# Patient Record
Sex: FEMALE | Race: WHITE | Employment: FULL TIME | URBAN - METROPOLITAN AREA
[De-identification: names, ages, dates, MRNs, and addresses within clinical notes are randomized per-mention and may not be internally consistent; named-entity substitution may affect disease eponyms.]

---

## 2018-04-25 ENCOUNTER — OFFICE VISIT (OUTPATIENT)
Dept: FAMILY MEDICINE CLINIC | Age: 33
End: 2018-04-25
Payer: COMMERCIAL

## 2018-04-25 VITALS
HEIGHT: 66 IN | BODY MASS INDEX: 32.47 KG/M2 | HEART RATE: 104 BPM | DIASTOLIC BLOOD PRESSURE: 80 MMHG | SYSTOLIC BLOOD PRESSURE: 120 MMHG | WEIGHT: 202 LBS | OXYGEN SATURATION: 98 % | RESPIRATION RATE: 16 BRPM

## 2018-04-25 DIAGNOSIS — F41.9 ANXIETY: Primary | ICD-10-CM

## 2018-04-25 DIAGNOSIS — M51.24 THORACIC DISC HERNIATION: ICD-10-CM

## 2018-04-25 DIAGNOSIS — Z13.220 SCREENING, LIPID: ICD-10-CM

## 2018-04-25 PROCEDURE — G8417 CALC BMI ABV UP PARAM F/U: HCPCS | Performed by: FAMILY MEDICINE

## 2018-04-25 PROCEDURE — G8427 DOCREV CUR MEDS BY ELIG CLIN: HCPCS | Performed by: FAMILY MEDICINE

## 2018-04-25 PROCEDURE — 99213 OFFICE O/P EST LOW 20 MIN: CPT | Performed by: FAMILY MEDICINE

## 2018-04-25 PROCEDURE — 1036F TOBACCO NON-USER: CPT | Performed by: FAMILY MEDICINE

## 2018-04-25 RX ORDER — BUSPIRONE HYDROCHLORIDE 10 MG/1
10 TABLET ORAL 2 TIMES DAILY
Qty: 60 TABLET | Refills: 2 | Status: CANCELLED | OUTPATIENT
Start: 2018-04-25

## 2018-04-25 ASSESSMENT — PATIENT HEALTH QUESTIONNAIRE - PHQ9
SUM OF ALL RESPONSES TO PHQ QUESTIONS 1-9: 2
1. LITTLE INTEREST OR PLEASURE IN DOING THINGS: 1
SUM OF ALL RESPONSES TO PHQ9 QUESTIONS 1 & 2: 2
2. FEELING DOWN, DEPRESSED OR HOPELESS: 1

## 2018-04-27 ASSESSMENT — ENCOUNTER SYMPTOMS
SHORTNESS OF BREATH: 0
BACK PAIN: 1
WHEEZING: 0
COUGH: 0
ABDOMINAL PAIN: 0
NAUSEA: 0

## 2018-07-03 DIAGNOSIS — F41.9 ANXIETY: ICD-10-CM

## 2018-07-03 RX ORDER — AMITRIPTYLINE HYDROCHLORIDE 100 MG/1
100 TABLET, FILM COATED ORAL NIGHTLY
Qty: 30 TABLET | Refills: 5 | Status: SHIPPED | OUTPATIENT
Start: 2018-07-03 | End: 2018-12-27 | Stop reason: SDUPTHER

## 2018-07-03 NOTE — TELEPHONE ENCOUNTER
From: Chencho Mendieta  Sent: 7/3/2018 10:06 AM EDT  Subject: Medication Renewal Request    Chencho Mendieta would like a refill of the following medications:     amitriptyline (ELAVIL) 100 MG tablet Danette Fonseca MD]    Preferred pharmacy: [facility] [address] [place] [address]

## 2018-07-06 ENCOUNTER — HOSPITAL ENCOUNTER (OUTPATIENT)
Age: 33
Discharge: HOME | End: 2018-07-06
Payer: COMMERCIAL

## 2018-07-06 DIAGNOSIS — Z13.220 SCREENING, LIPID: ICD-10-CM

## 2018-07-06 DIAGNOSIS — F41.9 ANXIETY: ICD-10-CM

## 2018-07-06 LAB
ALBUMIN SERPL-MCNC: 4.2 G/DL (ref 3.5–5.2)
ALP BLD-CCNC: 73 U/L (ref 35–104)
ALT SERPL-CCNC: 22 U/L (ref 0–32)
ANION GAP SERPL CALCULATED.3IONS-SCNC: 12 MMOL/L (ref 7–16)
AST SERPL-CCNC: 20 U/L (ref 0–31)
BASOPHILS ABSOLUTE: 0.07 E9/L (ref 0–0.2)
BASOPHILS RELATIVE PERCENT: 0.8 % (ref 0–2)
BILIRUB SERPL-MCNC: <0.2 MG/DL (ref 0–1.2)
BUN BLDV-MCNC: 11 MG/DL (ref 6–20)
CALCIUM SERPL-MCNC: 8.8 MG/DL (ref 8.6–10.2)
CHLORIDE BLD-SCNC: 100 MMOL/L (ref 98–107)
CHOLESTEROL, TOTAL: 196 MG/DL (ref 0–199)
CO2: 27 MMOL/L (ref 22–29)
CREAT SERPL-MCNC: 0.6 MG/DL (ref 0.5–1)
EOSINOPHILS ABSOLUTE: 0.11 E9/L (ref 0.05–0.5)
EOSINOPHILS RELATIVE PERCENT: 1.3 % (ref 0–6)
GFR AFRICAN AMERICAN: >60
GFR NON-AFRICAN AMERICAN: >60 ML/MIN/1.73
GLUCOSE BLD-MCNC: 89 MG/DL (ref 74–109)
HCT VFR BLD CALC: 37.1 % (ref 34–48)
HDLC SERPL-MCNC: 39 MG/DL
HEMOGLOBIN: 12.5 G/DL (ref 11.5–15.5)
IMMATURE GRANULOCYTES #: 0.02 E9/L
IMMATURE GRANULOCYTES %: 0.2 % (ref 0–5)
LDL CHOLESTEROL CALCULATED: 116 MG/DL (ref 0–99)
LYMPHOCYTES ABSOLUTE: 2.38 E9/L (ref 1.5–4)
LYMPHOCYTES RELATIVE PERCENT: 27.1 % (ref 20–42)
MCH RBC QN AUTO: 29.8 PG (ref 26–35)
MCHC RBC AUTO-ENTMCNC: 33.7 % (ref 32–34.5)
MCV RBC AUTO: 88.5 FL (ref 80–99.9)
MONOCYTES ABSOLUTE: 0.41 E9/L (ref 0.1–0.95)
MONOCYTES RELATIVE PERCENT: 4.7 % (ref 2–12)
NEUTROPHILS ABSOLUTE: 5.79 E9/L (ref 1.8–7.3)
NEUTROPHILS RELATIVE PERCENT: 65.9 % (ref 43–80)
PDW BLD-RTO: 12.3 FL (ref 11.5–15)
PLATELET # BLD: 284 E9/L (ref 130–450)
PMV BLD AUTO: 9.4 FL (ref 7–12)
POTASSIUM SERPL-SCNC: 3.6 MMOL/L (ref 3.5–5)
RBC # BLD: 4.19 E12/L (ref 3.5–5.5)
SODIUM BLD-SCNC: 139 MMOL/L (ref 132–146)
TOTAL PROTEIN: 7.3 G/DL (ref 6.4–8.3)
TRIGL SERPL-MCNC: 204 MG/DL (ref 0–149)
TSH SERPL DL<=0.05 MIU/L-ACNC: 1.44 UIU/ML (ref 0.27–4.2)
VLDLC SERPL CALC-MCNC: 41 MG/DL
WBC # BLD: 8.8 E9/L (ref 4.5–11.5)

## 2018-07-06 PROCEDURE — 80061 LIPID PANEL: CPT

## 2018-07-06 PROCEDURE — 36415 COLL VENOUS BLD VENIPUNCTURE: CPT

## 2018-07-06 PROCEDURE — 84443 ASSAY THYROID STIM HORMONE: CPT

## 2018-07-06 PROCEDURE — 80053 COMPREHEN METABOLIC PANEL: CPT

## 2018-07-06 PROCEDURE — 85025 COMPLETE CBC W/AUTO DIFF WBC: CPT

## 2018-07-19 ENCOUNTER — OFFICE VISIT (OUTPATIENT)
Dept: FAMILY MEDICINE CLINIC | Age: 33
End: 2018-07-19
Payer: COMMERCIAL

## 2018-07-19 VITALS
HEART RATE: 97 BPM | OXYGEN SATURATION: 96 % | HEIGHT: 66 IN | RESPIRATION RATE: 16 BRPM | BODY MASS INDEX: 32.47 KG/M2 | SYSTOLIC BLOOD PRESSURE: 127 MMHG | DIASTOLIC BLOOD PRESSURE: 83 MMHG | WEIGHT: 202 LBS

## 2018-07-19 DIAGNOSIS — F41.9 ANXIETY: Primary | ICD-10-CM

## 2018-07-19 PROCEDURE — G8417 CALC BMI ABV UP PARAM F/U: HCPCS | Performed by: FAMILY MEDICINE

## 2018-07-19 PROCEDURE — 1036F TOBACCO NON-USER: CPT | Performed by: FAMILY MEDICINE

## 2018-07-19 PROCEDURE — 99213 OFFICE O/P EST LOW 20 MIN: CPT | Performed by: FAMILY MEDICINE

## 2018-07-19 PROCEDURE — G8427 DOCREV CUR MEDS BY ELIG CLIN: HCPCS | Performed by: FAMILY MEDICINE

## 2018-07-19 RX ORDER — BUSPIRONE HYDROCHLORIDE 15 MG/1
15 TABLET ORAL 2 TIMES DAILY
Qty: 60 TABLET | Refills: 5 | Status: SHIPPED | OUTPATIENT
Start: 2018-07-19 | End: 2019-03-20 | Stop reason: SINTOL

## 2018-07-19 RX ORDER — BUSPIRONE HYDROCHLORIDE 10 MG/1
10 TABLET ORAL 2 TIMES DAILY
Qty: 60 TABLET | Refills: 2 | Status: CANCELLED | OUTPATIENT
Start: 2018-07-19

## 2018-07-19 NOTE — PROGRESS NOTES
CC   Chief Complaint   Patient presents with    Anxiety     HPI:   Patient comes in today for the below issue(s). Anxiety  Follow up  Tolerating buspar  Mood is still anxious but less so than before. Able to catch herself before full-on panic occurs  Had ? Allergic reaction to Cymbalta in the past.  She reports her improvement is notable and appreciated, but still wonders if there is further ground that can be gained. Denies SI or HI. Sleep is better. Review of Systems  ROS  As above  Past Medical History:   Diagnosis Date    Blood type, Rh negative     Gestational diabetes     pt takes lantus 28 units nightly    H/O bleeding disorder     Keratosis palmoplantaris with corneal dystrophy ([facility]     Dr Miriam North in [address] [place] Kidney stones     MTHFR mutation ([facility]     homozygous         PE:  VS:  /83   Pulse 97   Resp 16   Ht 5' 6\" (1.676 m)   Wt 202 lb (91.6 kg)   LMP 07/05/2018 (Exact Date)   SpO2 96%   BMI 32.60 kg/m²   Physical Exam   Constitutional: She appears well-developed and well-nourished. No distress. Psychiatric: She has a normal mood and affect. Her behavior is normal. Judgment normal.         Assessment (including specific orders/meds/labs):      Tawanna Solis was seen today for anxiety. Diagnoses and all orders for this visit:    Anxiety  -     busPIRone (BUSPAR) 15 MG tablet; Take 15 mg by mouth 2 times daily    Other orders  -     Cancel: busPIRone (BUSPAR) 10 MG tablet; Take 1 tablet by mouth 2 times daily        Plan:     Increase BuSpar to 15 mg, see if this helps her mood further. Reassess as below. Call if issues worsen in the meantime. Could consider a cautious retrial of a different SSRI. However will hold off for now. Counseled regarding above diagnosis, including possible risks and complications,  especially if left uncontrolled.  Counseled regarding the possible side effects, risks, benefits and alternatives to treatment; patient and/or guardian verbalizes

## 2018-10-17 ENCOUNTER — OFFICE VISIT (OUTPATIENT)
Dept: FAMILY MEDICINE CLINIC | Age: 33
End: 2018-10-17
Payer: COMMERCIAL

## 2018-10-17 VITALS
OXYGEN SATURATION: 98 % | DIASTOLIC BLOOD PRESSURE: 82 MMHG | WEIGHT: 206 LBS | BODY MASS INDEX: 33.11 KG/M2 | HEART RATE: 109 BPM | HEIGHT: 66 IN | SYSTOLIC BLOOD PRESSURE: 130 MMHG | RESPIRATION RATE: 16 BRPM

## 2018-10-17 DIAGNOSIS — F41.9 ANXIETY: Primary | ICD-10-CM

## 2018-10-17 PROCEDURE — 99212 OFFICE O/P EST SF 10 MIN: CPT | Performed by: FAMILY MEDICINE

## 2018-10-17 PROCEDURE — G8417 CALC BMI ABV UP PARAM F/U: HCPCS | Performed by: FAMILY MEDICINE

## 2018-10-17 PROCEDURE — G8427 DOCREV CUR MEDS BY ELIG CLIN: HCPCS | Performed by: FAMILY MEDICINE

## 2018-10-17 PROCEDURE — 1036F TOBACCO NON-USER: CPT | Performed by: FAMILY MEDICINE

## 2018-10-17 PROCEDURE — G8484 FLU IMMUNIZE NO ADMIN: HCPCS | Performed by: FAMILY MEDICINE

## 2018-10-17 ASSESSMENT — ENCOUNTER SYMPTOMS
CHEST TIGHTNESS: 0
BACK PAIN: 1

## 2018-12-27 DIAGNOSIS — F41.9 ANXIETY: ICD-10-CM

## 2018-12-27 RX ORDER — AMITRIPTYLINE HYDROCHLORIDE 100 MG/1
TABLET, FILM COATED ORAL
Qty: 30 TABLET | Refills: 0 | Status: SHIPPED | OUTPATIENT
Start: 2018-12-27 | End: 2019-01-31 | Stop reason: SDUPTHER

## 2019-03-20 ENCOUNTER — OFFICE VISIT (OUTPATIENT)
Dept: FAMILY MEDICINE CLINIC | Age: 34
End: 2019-03-20
Payer: COMMERCIAL

## 2019-03-20 VITALS
WEIGHT: 209 LBS | DIASTOLIC BLOOD PRESSURE: 90 MMHG | HEART RATE: 100 BPM | SYSTOLIC BLOOD PRESSURE: 132 MMHG | HEIGHT: 66 IN | OXYGEN SATURATION: 98 % | RESPIRATION RATE: 16 BRPM | BODY MASS INDEX: 33.59 KG/M2

## 2019-03-20 DIAGNOSIS — F41.9 ANXIETY: Primary | ICD-10-CM

## 2019-03-20 DIAGNOSIS — T50.905D ADVERSE EFFECT OF DRUG, SUBSEQUENT ENCOUNTER: ICD-10-CM

## 2019-03-20 PROCEDURE — 1036F TOBACCO NON-USER: CPT | Performed by: FAMILY MEDICINE

## 2019-03-20 PROCEDURE — G8417 CALC BMI ABV UP PARAM F/U: HCPCS | Performed by: FAMILY MEDICINE

## 2019-03-20 PROCEDURE — G8427 DOCREV CUR MEDS BY ELIG CLIN: HCPCS | Performed by: FAMILY MEDICINE

## 2019-03-20 PROCEDURE — G8484 FLU IMMUNIZE NO ADMIN: HCPCS | Performed by: FAMILY MEDICINE

## 2019-03-20 PROCEDURE — 99213 OFFICE O/P EST LOW 20 MIN: CPT | Performed by: FAMILY MEDICINE

## 2019-11-19 ENCOUNTER — HOSPITAL ENCOUNTER (EMERGENCY)
Age: 34
Discharge: HOME | End: 2019-11-19
Attending: EMERGENCY MEDICINE
Payer: COMMERCIAL

## 2019-11-19 ENCOUNTER — APPOINTMENT (OUTPATIENT)
Dept: CT IMAGING | Age: 34
End: 2019-11-19
Payer: COMMERCIAL

## 2019-11-19 VITALS
WEIGHT: 200 LBS | DIASTOLIC BLOOD PRESSURE: 76 MMHG | SYSTOLIC BLOOD PRESSURE: 133 MMHG | BODY MASS INDEX: 32.14 KG/M2 | TEMPERATURE: 97.9 F | HEIGHT: 66 IN | HEART RATE: 101 BPM | OXYGEN SATURATION: 98 % | RESPIRATION RATE: 16 BRPM

## 2019-11-19 DIAGNOSIS — E87.6 HYPOKALEMIA: ICD-10-CM

## 2019-11-19 DIAGNOSIS — R51.9 NONINTRACTABLE HEADACHE, UNSPECIFIED CHRONICITY PATTERN, UNSPECIFIED HEADACHE TYPE: Primary | ICD-10-CM

## 2019-11-19 DIAGNOSIS — R42 DIZZINESS: ICD-10-CM

## 2019-11-19 DIAGNOSIS — M62.838 MUSCLE SPASM: ICD-10-CM

## 2019-11-19 LAB
ANION GAP SERPL CALCULATED.3IONS-SCNC: 13 MMOL/L (ref 7–16)
BACTERIA: ABNORMAL /HPF
BASOPHILS ABSOLUTE: 0.05 E9/L (ref 0–0.2)
BASOPHILS RELATIVE PERCENT: 0.6 % (ref 0–2)
BILIRUBIN URINE: NEGATIVE
BLOOD, URINE: ABNORMAL
BUN BLDV-MCNC: 8 MG/DL (ref 6–20)
CALCIUM SERPL-MCNC: 9.2 MG/DL (ref 8.6–10.2)
CHLORIDE BLD-SCNC: 99 MMOL/L (ref 98–107)
CLARITY: CLEAR
CO2: 25 MMOL/L (ref 22–29)
COLOR: YELLOW
CREAT SERPL-MCNC: 0.5 MG/DL (ref 0.5–1)
EKG ATRIAL RATE: 101 BPM
EKG P AXIS: 33 DEGREES
EKG P-R INTERVAL: 174 MS
EKG Q-T INTERVAL: 358 MS
EKG QRS DURATION: 98 MS
EKG QTC CALCULATION (BAZETT): 464 MS
EKG R AXIS: 21 DEGREES
EKG T AXIS: 6 DEGREES
EKG VENTRICULAR RATE: 101 BPM
EOSINOPHILS ABSOLUTE: 0.07 E9/L (ref 0.05–0.5)
EOSINOPHILS RELATIVE PERCENT: 0.8 % (ref 0–6)
EPITHELIAL CELLS, UA: ABNORMAL /HPF
GFR AFRICAN AMERICAN: >60
GFR NON-AFRICAN AMERICAN: >60 ML/MIN/1.73
GLUCOSE BLD-MCNC: 130 MG/DL (ref 74–99)
GLUCOSE URINE: NEGATIVE MG/DL
HCG, URINE, POC: NEGATIVE
HCT VFR BLD CALC: 38.1 % (ref 34–48)
HEMOGLOBIN: 12.9 G/DL (ref 11.5–15.5)
IMMATURE GRANULOCYTES #: 0.04 E9/L
IMMATURE GRANULOCYTES %: 0.5 % (ref 0–5)
KETONES, URINE: NEGATIVE MG/DL
LEUKOCYTE ESTERASE, URINE: NEGATIVE
LYMPHOCYTES ABSOLUTE: 1.85 E9/L (ref 1.5–4)
LYMPHOCYTES RELATIVE PERCENT: 21.6 % (ref 20–42)
Lab: NORMAL
MAGNESIUM: 1.7 MG/DL (ref 1.6–2.6)
MCH RBC QN AUTO: 29.4 PG (ref 26–35)
MCHC RBC AUTO-ENTMCNC: 33.9 % (ref 32–34.5)
MCV RBC AUTO: 86.8 FL (ref 80–99.9)
MONOCYTES ABSOLUTE: 0.37 E9/L (ref 0.1–0.95)
MONOCYTES RELATIVE PERCENT: 4.3 % (ref 2–12)
NEGATIVE QC PASS/FAIL: NORMAL
NEUTROPHILS ABSOLUTE: 6.2 E9/L (ref 1.8–7.3)
NEUTROPHILS RELATIVE PERCENT: 72.2 % (ref 43–80)
NITRITE, URINE: NEGATIVE
PDW BLD-RTO: 12 FL (ref 11.5–15)
PH UA: 6.5 (ref 5–9)
PLATELET # BLD: 285 E9/L (ref 130–450)
PMV BLD AUTO: 9.1 FL (ref 7–12)
POSITIVE QC PASS/FAIL: NORMAL
POTASSIUM REFLEX MAGNESIUM: 3.3 MMOL/L (ref 3.5–5)
PROTEIN UA: NEGATIVE MG/DL
RBC # BLD: 4.39 E12/L (ref 3.5–5.5)
RBC UA: ABNORMAL /HPF (ref 0–2)
SODIUM BLD-SCNC: 137 MMOL/L (ref 132–146)
SPECIFIC GRAVITY UA: 1.01 (ref 1–1.03)
UROBILINOGEN, URINE: 0.2 E.U./DL
WBC # BLD: 8.6 E9/L (ref 4.5–11.5)
WBC UA: ABNORMAL /HPF (ref 0–5)

## 2019-11-19 PROCEDURE — 6360000002 HC RX W HCPCS: Performed by: EMERGENCY MEDICINE

## 2019-11-19 PROCEDURE — 2580000003 HC RX 258: Performed by: EMERGENCY MEDICINE

## 2019-11-19 PROCEDURE — 83735 ASSAY OF MAGNESIUM: CPT

## 2019-11-19 PROCEDURE — 99285 EMERGENCY DEPT VISIT HI MDM: CPT

## 2019-11-19 PROCEDURE — 85025 COMPLETE CBC W/AUTO DIFF WBC: CPT

## 2019-11-19 PROCEDURE — 6370000000 HC RX 637 (ALT 250 FOR IP): Performed by: EMERGENCY MEDICINE

## 2019-11-19 PROCEDURE — 93005 ELECTROCARDIOGRAM TRACING: CPT | Performed by: EMERGENCY MEDICINE

## 2019-11-19 PROCEDURE — 93010 ELECTROCARDIOGRAM REPORT: CPT | Performed by: INTERNAL MEDICINE

## 2019-11-19 PROCEDURE — 81001 URINALYSIS AUTO W/SCOPE: CPT

## 2019-11-19 PROCEDURE — 96374 THER/PROPH/DIAG INJ IV PUSH: CPT

## 2019-11-19 PROCEDURE — 80048 BASIC METABOLIC PNL TOTAL CA: CPT

## 2019-11-19 PROCEDURE — 96375 TX/PRO/DX INJ NEW DRUG ADDON: CPT

## 2019-11-19 PROCEDURE — 70450 CT HEAD/BRAIN W/O DYE: CPT

## 2019-11-19 RX ORDER — 0.9 % SODIUM CHLORIDE 0.9 %
1000 INTRAVENOUS SOLUTION INTRAVENOUS ONCE
Status: COMPLETED | OUTPATIENT
Start: 2019-11-19 | End: 2019-11-19

## 2019-11-19 RX ORDER — PROCHLORPERAZINE EDISYLATE 5 MG/ML
10 INJECTION INTRAMUSCULAR; INTRAVENOUS ONCE
Status: COMPLETED | OUTPATIENT
Start: 2019-11-19 | End: 2019-11-19

## 2019-11-19 RX ORDER — DIPHENHYDRAMINE HYDROCHLORIDE 50 MG/ML
25 INJECTION INTRAMUSCULAR; INTRAVENOUS ONCE
Status: COMPLETED | OUTPATIENT
Start: 2019-11-19 | End: 2019-11-19

## 2019-11-19 RX ORDER — KETOROLAC TROMETHAMINE 30 MG/ML
15 INJECTION, SOLUTION INTRAMUSCULAR; INTRAVENOUS ONCE
Status: COMPLETED | OUTPATIENT
Start: 2019-11-19 | End: 2019-11-19

## 2019-11-19 RX ORDER — POTASSIUM CHLORIDE 1.5 G/1.77G
40 POWDER, FOR SOLUTION ORAL ONCE
Status: DISCONTINUED | OUTPATIENT
Start: 2019-11-19 | End: 2019-11-19

## 2019-11-19 RX ADMIN — POTASSIUM BICARBONATE 40 MEQ: 782 TABLET, EFFERVESCENT ORAL at 17:50

## 2019-11-19 RX ADMIN — SODIUM CHLORIDE 1000 ML: 9 INJECTION, SOLUTION INTRAVENOUS at 15:18

## 2019-11-19 RX ADMIN — KETOROLAC TROMETHAMINE 15 MG: 30 INJECTION, SOLUTION INTRAMUSCULAR; INTRAVENOUS at 16:51

## 2019-11-19 RX ADMIN — PROCHLORPERAZINE EDISYLATE 10 MG: 5 INJECTION INTRAMUSCULAR; INTRAVENOUS at 16:51

## 2019-11-19 RX ADMIN — DIPHENHYDRAMINE HYDROCHLORIDE 25 MG: 50 INJECTION, SOLUTION INTRAMUSCULAR; INTRAVENOUS at 16:51

## 2019-11-19 ASSESSMENT — ENCOUNTER SYMPTOMS
PHOTOPHOBIA: 0
NAUSEA: 0
COLOR CHANGE: 0
EYE REDNESS: 0
CHEST TIGHTNESS: 0
EYE PAIN: 0
SINUS PRESSURE: 0
VOMITING: 0
ABDOMINAL PAIN: 0
TROUBLE SWALLOWING: 0
COUGH: 0
VOICE CHANGE: 0
CONSTIPATION: 0
SINUS PAIN: 0
DIARRHEA: 0
SHORTNESS OF BREATH: 0
BLOOD IN STOOL: 0

## 2019-11-19 ASSESSMENT — PAIN SCALES - GENERAL: PAINLEVEL_OUTOF10: 8

## 2019-11-25 ENCOUNTER — OFFICE VISIT (OUTPATIENT)
Dept: FAMILY MEDICINE CLINIC | Age: 34
End: 2019-11-25
Payer: COMMERCIAL

## 2019-11-25 VITALS
BODY MASS INDEX: 33.27 KG/M2 | OXYGEN SATURATION: 98 % | HEART RATE: 102 BPM | RESPIRATION RATE: 16 BRPM | DIASTOLIC BLOOD PRESSURE: 99 MMHG | SYSTOLIC BLOOD PRESSURE: 140 MMHG | HEIGHT: 66 IN | WEIGHT: 207 LBS

## 2019-11-25 DIAGNOSIS — G44.89 OTHER HEADACHE SYNDROME: ICD-10-CM

## 2019-11-25 DIAGNOSIS — R55 SYNCOPE, UNSPECIFIED SYNCOPE TYPE: Primary | ICD-10-CM

## 2019-11-25 PROCEDURE — G8484 FLU IMMUNIZE NO ADMIN: HCPCS | Performed by: STUDENT IN AN ORGANIZED HEALTH CARE EDUCATION/TRAINING PROGRAM

## 2019-11-25 PROCEDURE — 99214 OFFICE O/P EST MOD 30 MIN: CPT | Performed by: STUDENT IN AN ORGANIZED HEALTH CARE EDUCATION/TRAINING PROGRAM

## 2019-11-25 PROCEDURE — G8427 DOCREV CUR MEDS BY ELIG CLIN: HCPCS | Performed by: STUDENT IN AN ORGANIZED HEALTH CARE EDUCATION/TRAINING PROGRAM

## 2019-11-25 PROCEDURE — G8417 CALC BMI ABV UP PARAM F/U: HCPCS | Performed by: STUDENT IN AN ORGANIZED HEALTH CARE EDUCATION/TRAINING PROGRAM

## 2019-11-25 PROCEDURE — 1036F TOBACCO NON-USER: CPT | Performed by: STUDENT IN AN ORGANIZED HEALTH CARE EDUCATION/TRAINING PROGRAM

## 2019-11-25 ASSESSMENT — PATIENT HEALTH QUESTIONNAIRE - PHQ9
SUM OF ALL RESPONSES TO PHQ QUESTIONS 1-9: 1
SUM OF ALL RESPONSES TO PHQ QUESTIONS 1-9: 1
2. FEELING DOWN, DEPRESSED OR HOPELESS: 1
SUM OF ALL RESPONSES TO PHQ9 QUESTIONS 1 & 2: 1
1. LITTLE INTEREST OR PLEASURE IN DOING THINGS: 0

## 2019-11-26 ENCOUNTER — TELEPHONE (OUTPATIENT)
Dept: ADMINISTRATIVE | Age: 34
End: 2019-11-26

## 2019-11-27 ASSESSMENT — ENCOUNTER SYMPTOMS
CHEST TIGHTNESS: 0
PHOTOPHOBIA: 0
DIARRHEA: 0
SHORTNESS OF BREATH: 0
CONSTIPATION: 0
SINUS PAIN: 0
COUGH: 0
ABDOMINAL PAIN: 0
COLOR CHANGE: 0
WHEEZING: 0
BACK PAIN: 0
BLOOD IN STOOL: 0

## 2019-12-05 PROBLEM — R55 NEUROCARDIOGENIC SYNCOPE: Status: ACTIVE | Noted: 2019-12-05

## 2019-12-06 ENCOUNTER — TELEPHONE (OUTPATIENT)
Dept: CARDIOLOGY CLINIC | Age: 34
End: 2019-12-06

## 2019-12-06 ENCOUNTER — OFFICE VISIT (OUTPATIENT)
Dept: CARDIOLOGY CLINIC | Age: 34
End: 2019-12-06
Payer: COMMERCIAL

## 2019-12-06 VITALS
SYSTOLIC BLOOD PRESSURE: 128 MMHG | RESPIRATION RATE: 16 BRPM | WEIGHT: 207 LBS | DIASTOLIC BLOOD PRESSURE: 80 MMHG | HEART RATE: 105 BPM | BODY MASS INDEX: 33.27 KG/M2 | HEIGHT: 66 IN

## 2019-12-06 DIAGNOSIS — R55 NEUROCARDIOGENIC SYNCOPE: ICD-10-CM

## 2019-12-06 DIAGNOSIS — R55 NEUROCARDIOGENIC SYNCOPE: Primary | ICD-10-CM

## 2019-12-06 PROCEDURE — G8427 DOCREV CUR MEDS BY ELIG CLIN: HCPCS | Performed by: INTERNAL MEDICINE

## 2019-12-06 PROCEDURE — 99244 OFF/OP CNSLTJ NEW/EST MOD 40: CPT | Performed by: INTERNAL MEDICINE

## 2019-12-06 PROCEDURE — G8417 CALC BMI ABV UP PARAM F/U: HCPCS | Performed by: INTERNAL MEDICINE

## 2019-12-06 PROCEDURE — G8484 FLU IMMUNIZE NO ADMIN: HCPCS | Performed by: INTERNAL MEDICINE

## 2019-12-06 PROCEDURE — 93000 ELECTROCARDIOGRAM COMPLETE: CPT | Performed by: INTERNAL MEDICINE

## 2019-12-06 RX ORDER — IBUPROFEN 200 MG
200 TABLET ORAL EVERY 6 HOURS PRN
COMMUNITY

## 2019-12-19 ENCOUNTER — OFFICE VISIT (OUTPATIENT)
Dept: FAMILY MEDICINE CLINIC | Age: 34
End: 2019-12-19
Payer: COMMERCIAL

## 2019-12-19 ENCOUNTER — HOSPITAL ENCOUNTER (OUTPATIENT)
Age: 34
Discharge: HOME | End: 2019-12-19
Payer: COMMERCIAL

## 2019-12-19 VITALS
OXYGEN SATURATION: 99 % | BODY MASS INDEX: 33.43 KG/M2 | RESPIRATION RATE: 16 BRPM | DIASTOLIC BLOOD PRESSURE: 90 MMHG | HEART RATE: 103 BPM | SYSTOLIC BLOOD PRESSURE: 144 MMHG | HEIGHT: 66 IN | WEIGHT: 208 LBS

## 2019-12-19 DIAGNOSIS — I10 HYPERTENSION, UNSPECIFIED TYPE: Primary | ICD-10-CM

## 2019-12-19 DIAGNOSIS — R55 SYNCOPE, UNSPECIFIED SYNCOPE TYPE: ICD-10-CM

## 2019-12-19 DIAGNOSIS — R51.9 NONINTRACTABLE HEADACHE, UNSPECIFIED CHRONICITY PATTERN, UNSPECIFIED HEADACHE TYPE: ICD-10-CM

## 2019-12-19 DIAGNOSIS — R42 DIZZINESS: ICD-10-CM

## 2019-12-19 DIAGNOSIS — I10 HYPERTENSION, UNSPECIFIED TYPE: ICD-10-CM

## 2019-12-19 LAB
ALBUMIN SERPL-MCNC: 4.5 G/DL (ref 3.5–5.2)
ALP BLD-CCNC: 77 U/L (ref 35–104)
ALT SERPL-CCNC: 27 U/L (ref 0–32)
ANION GAP SERPL CALCULATED.3IONS-SCNC: 9 MMOL/L (ref 7–16)
AST SERPL-CCNC: 22 U/L (ref 0–31)
BILIRUB SERPL-MCNC: <0.2 MG/DL (ref 0–1.2)
BUN BLDV-MCNC: 8 MG/DL (ref 6–20)
CALCIUM SERPL-MCNC: 9 MG/DL (ref 8.6–10.2)
CHLORIDE BLD-SCNC: 99 MMOL/L (ref 98–107)
CO2: 28 MMOL/L (ref 22–29)
CREAT SERPL-MCNC: 0.5 MG/DL (ref 0.5–1)
GFR AFRICAN AMERICAN: >60
GFR NON-AFRICAN AMERICAN: >60 ML/MIN/1.73
GLUCOSE BLD-MCNC: 106 MG/DL (ref 74–99)
POTASSIUM SERPL-SCNC: 3.9 MMOL/L (ref 3.5–5)
SODIUM BLD-SCNC: 136 MMOL/L (ref 132–146)
TOTAL PROTEIN: 8 G/DL (ref 6.4–8.3)

## 2019-12-19 PROCEDURE — G8417 CALC BMI ABV UP PARAM F/U: HCPCS | Performed by: FAMILY MEDICINE

## 2019-12-19 PROCEDURE — 1036F TOBACCO NON-USER: CPT | Performed by: FAMILY MEDICINE

## 2019-12-19 PROCEDURE — 99214 OFFICE O/P EST MOD 30 MIN: CPT | Performed by: FAMILY MEDICINE

## 2019-12-19 PROCEDURE — G8427 DOCREV CUR MEDS BY ELIG CLIN: HCPCS | Performed by: FAMILY MEDICINE

## 2019-12-19 PROCEDURE — 82088 ASSAY OF ALDOSTERONE: CPT

## 2019-12-19 PROCEDURE — G8484 FLU IMMUNIZE NO ADMIN: HCPCS | Performed by: FAMILY MEDICINE

## 2019-12-19 PROCEDURE — 36415 COLL VENOUS BLD VENIPUNCTURE: CPT

## 2019-12-19 PROCEDURE — 80053 COMPREHEN METABOLIC PANEL: CPT

## 2019-12-19 PROCEDURE — 84244 ASSAY OF RENIN: CPT

## 2019-12-19 RX ORDER — SCOLOPAMINE TRANSDERMAL SYSTEM 1 MG/1
1 PATCH, EXTENDED RELEASE TRANSDERMAL
Qty: 10 PATCH | Refills: 11 | Status: SHIPPED | OUTPATIENT
Start: 2019-12-19 | End: 2020-01-23 | Stop reason: ALTCHOICE

## 2019-12-19 RX ORDER — PROPRANOLOL HCL 60 MG
60 CAPSULE, EXTENDED RELEASE 24HR ORAL DAILY
Qty: 30 CAPSULE | Refills: 3 | Status: SHIPPED
Start: 2019-12-19 | End: 2020-04-20

## 2019-12-20 ASSESSMENT — ENCOUNTER SYMPTOMS
NAUSEA: 1
SHORTNESS OF BREATH: 0
VOMITING: 0
ABDOMINAL PAIN: 0
DIARRHEA: 0
TROUBLE SWALLOWING: 0
EYE PAIN: 0

## 2019-12-22 LAB
ALDOSTERONE: <3 NG/DL
RENIN ACTIVITY: 0.6 NG/ML/HR

## 2019-12-23 DIAGNOSIS — R55 SYNCOPE, UNSPECIFIED SYNCOPE TYPE: Primary | ICD-10-CM

## 2019-12-23 DIAGNOSIS — R42 DIZZINESS: ICD-10-CM

## 2019-12-28 ENCOUNTER — HOSPITAL ENCOUNTER (OUTPATIENT)
Dept: MRI IMAGING | Age: 34
Discharge: HOME | End: 2019-12-30
Payer: COMMERCIAL

## 2019-12-28 ENCOUNTER — HOSPITAL ENCOUNTER (OUTPATIENT)
Age: 34
Discharge: HOME | End: 2019-12-28
Payer: COMMERCIAL

## 2019-12-28 DIAGNOSIS — R51.9 NONINTRACTABLE HEADACHE, UNSPECIFIED CHRONICITY PATTERN, UNSPECIFIED HEADACHE TYPE: ICD-10-CM

## 2019-12-28 DIAGNOSIS — R42 DIZZINESS: ICD-10-CM

## 2019-12-28 DIAGNOSIS — R55 SYNCOPE, UNSPECIFIED SYNCOPE TYPE: ICD-10-CM

## 2019-12-28 LAB
CORTISOL TOTAL: 7.35 MCG/DL (ref 2.68–18.4)
HBA1C MFR BLD: 5.6 % (ref 4–5.6)

## 2019-12-28 PROCEDURE — 36415 COLL VENOUS BLD VENIPUNCTURE: CPT

## 2019-12-28 PROCEDURE — 82533 TOTAL CORTISOL: CPT

## 2019-12-28 PROCEDURE — 70551 MRI BRAIN STEM W/O DYE: CPT

## 2019-12-28 PROCEDURE — 83036 HEMOGLOBIN GLYCOSYLATED A1C: CPT

## 2020-01-14 ENCOUNTER — TELEPHONE (OUTPATIENT)
Dept: CARDIOLOGY CLINIC | Age: 35
End: 2020-01-14

## 2020-01-14 NOTE — TELEPHONE ENCOUNTER
Pt called to see if the tilt table test ordered by Dr Bay Service has been scheduled yet.   Please let her know if/when it is scheduled [phone]

## 2020-01-15 ENCOUNTER — HOSPITAL ENCOUNTER (OUTPATIENT)
Dept: AUDIOLOGY | Age: 35
Discharge: HOME | End: 2020-01-15
Payer: COMMERCIAL

## 2020-01-15 PROCEDURE — 92567 TYMPANOMETRY: CPT | Performed by: AUDIOLOGIST

## 2020-01-15 PROCEDURE — 92557 COMPREHENSIVE HEARING TEST: CPT | Performed by: AUDIOLOGIST

## 2020-01-15 NOTE — TELEPHONE ENCOUNTER
I called and left a message for Patrice Li to call back and schedule tilt. I was going to offer this Friday with Dr. Lashanda Mcgrath.

## 2020-01-23 ENCOUNTER — HOSPITAL ENCOUNTER (OUTPATIENT)
Dept: CARDIAC CATH/INVASIVE PROCEDURES | Age: 35
Discharge: HOME | End: 2020-01-23
Payer: COMMERCIAL

## 2020-01-23 VITALS
DIASTOLIC BLOOD PRESSURE: 88 MMHG | RESPIRATION RATE: 18 BRPM | HEART RATE: 90 BPM | TEMPERATURE: 98.6 F | SYSTOLIC BLOOD PRESSURE: 134 MMHG | WEIGHT: 205 LBS | BODY MASS INDEX: 32.95 KG/M2 | HEIGHT: 66 IN

## 2020-01-23 PROCEDURE — 2580000003 HC RX 258: Performed by: INTERNAL MEDICINE

## 2020-01-23 PROCEDURE — 93660 TILT TABLE EVALUATION: CPT | Performed by: INTERNAL MEDICINE

## 2020-01-23 PROCEDURE — 93660 TILT TABLE EVALUATION: CPT

## 2020-01-23 RX ORDER — SODIUM CHLORIDE 9 MG/ML
INJECTION, SOLUTION INTRAVENOUS ONCE
Status: COMPLETED | OUTPATIENT
Start: 2020-01-23 | End: 2020-01-23

## 2020-01-23 RX ADMIN — SODIUM CHLORIDE: 9 INJECTION, SOLUTION INTRAVENOUS at 08:44

## 2020-03-10 ENCOUNTER — OFFICE VISIT (OUTPATIENT)
Dept: FAMILY MEDICINE CLINIC | Age: 35
End: 2020-03-10
Payer: COMMERCIAL

## 2020-03-10 VITALS
OXYGEN SATURATION: 99 % | RESPIRATION RATE: 16 BRPM | SYSTOLIC BLOOD PRESSURE: 108 MMHG | HEART RATE: 82 BPM | DIASTOLIC BLOOD PRESSURE: 74 MMHG | WEIGHT: 208 LBS | BODY MASS INDEX: 33.43 KG/M2 | HEIGHT: 66 IN

## 2020-03-10 PROCEDURE — G8417 CALC BMI ABV UP PARAM F/U: HCPCS | Performed by: FAMILY MEDICINE

## 2020-03-10 PROCEDURE — 1036F TOBACCO NON-USER: CPT | Performed by: FAMILY MEDICINE

## 2020-03-10 PROCEDURE — G8484 FLU IMMUNIZE NO ADMIN: HCPCS | Performed by: FAMILY MEDICINE

## 2020-03-10 PROCEDURE — G8427 DOCREV CUR MEDS BY ELIG CLIN: HCPCS | Performed by: FAMILY MEDICINE

## 2020-03-10 PROCEDURE — 99212 OFFICE O/P EST SF 10 MIN: CPT | Performed by: FAMILY MEDICINE

## 2020-03-10 ASSESSMENT — PATIENT HEALTH QUESTIONNAIRE - PHQ9
SUM OF ALL RESPONSES TO PHQ9 QUESTIONS 1 & 2: 0
SUM OF ALL RESPONSES TO PHQ QUESTIONS 1-9: 0
SUM OF ALL RESPONSES TO PHQ QUESTIONS 1-9: 0
1. LITTLE INTEREST OR PLEASURE IN DOING THINGS: 0
2. FEELING DOWN, DEPRESSED OR HOPELESS: 0

## 2020-03-10 ASSESSMENT — ENCOUNTER SYMPTOMS: SHORTNESS OF BREATH: 0

## 2020-03-10 NOTE — PROGRESS NOTES
CC   Chief Complaint   Patient presents with    Loss of Consciousness     HPI:   Patient comes in today for the below issue(s). Follow-up of syncope  See previous notes  MRI done, unremarkable  Audiology evaluation failed to show significant issue  Tilt table testing also unremarkable  Most likely dealing with vasovagal syncope of uncertain etiology  States has been trying to increase fluid intake, salt intake. Issues have improved to the point she feels they are stable  Feels migraines are controlled on current regimen, so hesitant to consider discontinuation or reduction of propranolol  At this point, she feels the issues are satisfactorily managed        Review of Systems  Review of Systems   Constitutional: Positive for fatigue. Negative for chills and fever. Respiratory: Negative for shortness of breath. Cardiovascular: Negative for chest pain, palpitations and leg swelling. Neurological: Negative. Psychiatric/Behavioral: Negative for agitation and sleep disturbance. Past Medical History:   Diagnosis Date    Blood type, Rh negative     Gestational diabetes     pt takes lantus 28 units nightly    H/O bleeding disorder     Keratosis palmoplantaris with corneal dystrophy ([facility] [address]     Dr Aristides Livingston in [address] [place] Kidney stones     MTHFR mutation ([facility]     homozygous         PE:  VS:  /74   Pulse 82   Resp 16   Ht 5' 6\" (1.676 m)   Wt 208 lb (94.3 kg)   LMP 03/10/2020 (Exact Date)   SpO2 99%   BMI 33.57 kg/m²   Physical Exam  Vitals signs reviewed. Constitutional:       General: She is not in acute distress. Appearance: Normal appearance. HENT:      Head: Normocephalic and atraumatic. Mouth/Throat:      Mouth: Mucous membranes are moist.      Pharynx: Oropharynx is clear. Eyes:      Conjunctiva/sclera: Conjunctivae normal.      Pupils: Pupils are equal, round, and reactive to light. Neck:      Musculoskeletal: Normal range of motion and neck supple.

## 2020-04-20 RX ORDER — PROPRANOLOL HCL 60 MG
60 CAPSULE, EXTENDED RELEASE 24HR ORAL DAILY
Qty: 30 CAPSULE | Refills: 3 | Status: SHIPPED
Start: 2020-04-20 | End: 2020-12-04 | Stop reason: SDUPTHER

## 2020-06-04 RX ORDER — AMITRIPTYLINE HYDROCHLORIDE 100 MG/1
TABLET, FILM COATED ORAL
Qty: 30 TABLET | Refills: 5 | Status: SHIPPED
Start: 2020-06-04 | End: 2020-12-05 | Stop reason: SDUPTHER

## 2020-12-04 RX ORDER — PROPRANOLOL HCL 60 MG
60 CAPSULE, EXTENDED RELEASE 24HR ORAL DAILY
Qty: 30 CAPSULE | Refills: 3 | Status: SHIPPED
Start: 2020-12-04 | End: 2021-04-09

## 2020-12-07 RX ORDER — AMITRIPTYLINE HYDROCHLORIDE 100 MG/1
100 TABLET, FILM COATED ORAL NIGHTLY
Qty: 30 TABLET | Refills: 5 | Status: SHIPPED
Start: 2020-12-07 | End: 2020-12-10

## 2020-12-10 ENCOUNTER — VIRTUAL VISIT (OUTPATIENT)
Dept: FAMILY MEDICINE CLINIC | Age: 35
End: 2020-12-10
Payer: COMMERCIAL

## 2020-12-10 PROCEDURE — 99213 OFFICE O/P EST LOW 20 MIN: CPT | Performed by: FAMILY MEDICINE

## 2020-12-10 PROCEDURE — G8427 DOCREV CUR MEDS BY ELIG CLIN: HCPCS | Performed by: FAMILY MEDICINE

## 2020-12-10 RX ORDER — AMITRIPTYLINE HYDROCHLORIDE 150 MG/1
150 TABLET, FILM COATED ORAL NIGHTLY
Qty: 30 TABLET | Refills: 5 | Status: SHIPPED
Start: 2020-12-10 | End: 2021-05-12

## 2020-12-10 ASSESSMENT — ENCOUNTER SYMPTOMS: BACK PAIN: 1

## 2020-12-10 NOTE — PROGRESS NOTES
TeleMedicine Patient Consent    This visit was performed as a virtual video visit using a synchronous, two-way, audio-video telehealth technology platform. Patient identification was verified at the start of the visit, including the patient's telephone number and physical location. I discussed with the patient the nature of our telehealth visits, that:     1. Due to the nature of an audio- video modality, the only components of a physical exam that could be done are the elements supported by direct observation. 2. The provider will evaluate the patient and recommend diagnostics and treatments based on their assessment. 3. If it was felt that the patient should be evaluated in clinic or an emergency room setting, then they would be directed there. 4. Our sessions are not being recorded and that personal health information is protected. 5. Our team would provide follow up care in person if/when the patient needs it. Patient does agree to proceed with telemedicine consultation. Patient location: home address in [address] [place] [address]     Physician location: regular office location    This visit was completed virtually using Doxy. me    This visit was performed during the 2534 public health crisis and COVID-19 pandemic.   *Add GT modifier to all Video Visits*
issues worsen    Syncope issues have stabilized. Will monitor for now    Chronic back pain issues are stable. Migraines are stable. Questions addressed, she verbalized comfort and understanding of plan      Return in about 6 weeks (around 1/21/2021), or if symptoms worsen or fail to improve, for check on recent med changes/start. An  electronic signature was used to authenticate this note. --Dolores Tejada MD on 12/10/2020 at 8:45 AM        Pursuant to the emergency declaration under the [address] [place] [address], [facility] waiver authority and the [employer] and [employer] Act, this Virtual  Visit was conducted, with patient's consent, to reduce the patient's risk of exposure to COVID-19 and provide continuity of care for an established patient. Services were provided through a video synchronous discussion virtually to substitute for in-person clinic visit.

## 2021-02-03 ENCOUNTER — OFFICE VISIT (OUTPATIENT)
Dept: FAMILY MEDICINE CLINIC | Age: 36
End: 2021-02-03
Payer: COMMERCIAL

## 2021-02-03 VITALS
BODY MASS INDEX: 35.2 KG/M2 | OXYGEN SATURATION: 99 % | SYSTOLIC BLOOD PRESSURE: 118 MMHG | WEIGHT: 219 LBS | HEIGHT: 66 IN | HEART RATE: 86 BPM | TEMPERATURE: 96.9 F | RESPIRATION RATE: 16 BRPM | DIASTOLIC BLOOD PRESSURE: 73 MMHG

## 2021-02-03 DIAGNOSIS — F41.9 ANXIETY: Primary | ICD-10-CM

## 2021-02-03 DIAGNOSIS — E88.81 INSULIN RESISTANCE: ICD-10-CM

## 2021-02-03 PROCEDURE — 1036F TOBACCO NON-USER: CPT | Performed by: FAMILY MEDICINE

## 2021-02-03 PROCEDURE — G8484 FLU IMMUNIZE NO ADMIN: HCPCS | Performed by: FAMILY MEDICINE

## 2021-02-03 PROCEDURE — 99214 OFFICE O/P EST MOD 30 MIN: CPT | Performed by: FAMILY MEDICINE

## 2021-02-03 PROCEDURE — G8417 CALC BMI ABV UP PARAM F/U: HCPCS | Performed by: FAMILY MEDICINE

## 2021-02-03 PROCEDURE — G8427 DOCREV CUR MEDS BY ELIG CLIN: HCPCS | Performed by: FAMILY MEDICINE

## 2021-02-03 ASSESSMENT — PATIENT HEALTH QUESTIONNAIRE - PHQ9
SUM OF ALL RESPONSES TO PHQ9 QUESTIONS 1 & 2: 0
SUM OF ALL RESPONSES TO PHQ QUESTIONS 1-9: 0
1. LITTLE INTEREST OR PLEASURE IN DOING THINGS: 0
SUM OF ALL RESPONSES TO PHQ QUESTIONS 1-9: 0

## 2021-02-03 NOTE — PROGRESS NOTES
Heart sounds: No murmur. Pulmonary:      Effort: Pulmonary effort is normal.      Breath sounds: Normal breath sounds. Abdominal:      General: Bowel sounds are normal. There is no distension. Tenderness: There is no abdominal tenderness. Lymphadenopathy:      Cervical: No cervical adenopathy. Neurological:      Mental Status: She is alert. Psychiatric:         Mood and Affect: Mood normal.         Behavior: Behavior normal.         Judgment: Judgment normal.           Assessment (including specific orders/meds/labs):      Ismael Leblanc was seen today for anxiety. Diagnoses and all orders for this visit:    Anxiety    Insulin resistance  -     metFORMIN (GLUCOPHAGE) 500 MG tablet; Take 1 tablet by mouth 2 times daily (with meals)  -     Hemoglobin A1C; Future  -     Comprehensive Metabolic Panel; Future  -     INSULIN, TOTAL; Future        Plan:   Anxiety issues are ongoing but she feels they are stable. We will continue to manage with current regimen of medications. Emotional support offered. I recommend that she work to find time for herself to engage in self-care. Counseled on the likely diagnosis of insulin resistance. Indicated this may or may not be a predictor of future diabetes. We discussed some of her physiologic issues and explained that they may be at least in part aggravated by insulin resistance. She is agreeable to a restart of Metformin. Labs being checked as above in a few months to assess for progress on medication. Patient and/or guardian given opportunity to ask questions/raise concerns. She verbalized comfort and understanding of instructions. Follow Up:     Return in about 3 months (around 5/3/2021), or if symptoms worsen or fail to improve, for insulin resistance and mood .  or sooner if the above issues change unexpectedly or new issues develop This document was prepared at least partially through the use of[employer]Pron[employer]ice recognition software. Although effort is taken to assure the accuracy of this document, it is possible that grammatical, syntax,  or spelling errors may occur.       Electronically signed by Harsh Osorio MD [facility]

## 2021-04-09 RX ORDER — PROPRANOLOL HCL 60 MG
60 CAPSULE, EXTENDED RELEASE 24HR ORAL DAILY
Qty: 30 CAPSULE | Refills: 0 | Status: SHIPPED
Start: 2021-04-09 | End: 2021-05-10 | Stop reason: SDUPTHER

## 2021-05-05 ENCOUNTER — HOSPITAL ENCOUNTER (OUTPATIENT)
Age: 36
Discharge: HOME | End: 2021-05-05
Payer: COMMERCIAL

## 2021-05-05 DIAGNOSIS — E88.81 INSULIN RESISTANCE: ICD-10-CM

## 2021-05-05 LAB
ALBUMIN SERPL-MCNC: 4.1 G/DL (ref 3.5–5.2)
ALP BLD-CCNC: 72 U/L (ref 35–104)
ALT SERPL-CCNC: 30 U/L (ref 0–32)
ANION GAP SERPL CALCULATED.3IONS-SCNC: 8 MMOL/L (ref 7–16)
AST SERPL-CCNC: 23 U/L (ref 0–31)
BILIRUB SERPL-MCNC: 0.4 MG/DL (ref 0–1.2)
BUN BLDV-MCNC: 8 MG/DL (ref 6–20)
CALCIUM SERPL-MCNC: 8.8 MG/DL (ref 8.6–10.2)
CHLORIDE BLD-SCNC: 103 MMOL/L (ref 98–107)
CO2: 27 MMOL/L (ref 22–29)
CREAT SERPL-MCNC: 0.6 MG/DL (ref 0.5–1)
GFR AFRICAN AMERICAN: >60
GFR NON-AFRICAN AMERICAN: >60 ML/MIN/1.73
GLUCOSE BLD-MCNC: 128 MG/DL (ref 74–99)
HBA1C MFR BLD: 6.2 % (ref 4–5.6)
POTASSIUM SERPL-SCNC: 3.9 MMOL/L (ref 3.5–5)
SODIUM BLD-SCNC: 138 MMOL/L (ref 132–146)
TOTAL PROTEIN: 7.2 G/DL (ref 6.4–8.3)

## 2021-05-05 PROCEDURE — 36415 COLL VENOUS BLD VENIPUNCTURE: CPT

## 2021-05-05 PROCEDURE — 83036 HEMOGLOBIN GLYCOSYLATED A1C: CPT

## 2021-05-05 PROCEDURE — 83525 ASSAY OF INSULIN: CPT

## 2021-05-05 PROCEDURE — 80053 COMPREHEN METABOLIC PANEL: CPT

## 2021-05-07 LAB — INSULIN: 13 UIU/ML

## 2021-05-10 RX ORDER — PROPRANOLOL HCL 60 MG
60 CAPSULE, EXTENDED RELEASE 24HR ORAL DAILY
Qty: 30 CAPSULE | Refills: 5 | Status: SHIPPED
Start: 2021-05-10 | End: 2021-11-16

## 2021-05-10 NOTE — TELEPHONE ENCOUNTER
Last Appointment   2/3/2021  Next Appointment  5/12/2021    Pharmacy called, patient needs refill on Propranolol Er.  Med pending drs approval.

## 2021-05-12 ENCOUNTER — OFFICE VISIT (OUTPATIENT)
Dept: FAMILY MEDICINE CLINIC | Age: 36
End: 2021-05-12
Payer: COMMERCIAL

## 2021-05-12 VITALS
WEIGHT: 213 LBS | HEART RATE: 117 BPM | OXYGEN SATURATION: 98 % | HEIGHT: 66 IN | TEMPERATURE: 98.3 F | SYSTOLIC BLOOD PRESSURE: 126 MMHG | RESPIRATION RATE: 18 BRPM | BODY MASS INDEX: 34.23 KG/M2 | DIASTOLIC BLOOD PRESSURE: 89 MMHG

## 2021-05-12 DIAGNOSIS — E88.81 INSULIN RESISTANCE: ICD-10-CM

## 2021-05-12 DIAGNOSIS — F41.9 ANXIETY: ICD-10-CM

## 2021-05-12 PROCEDURE — G8417 CALC BMI ABV UP PARAM F/U: HCPCS | Performed by: FAMILY MEDICINE

## 2021-05-12 PROCEDURE — 1036F TOBACCO NON-USER: CPT | Performed by: FAMILY MEDICINE

## 2021-05-12 PROCEDURE — 99214 OFFICE O/P EST MOD 30 MIN: CPT | Performed by: FAMILY MEDICINE

## 2021-05-12 PROCEDURE — G8427 DOCREV CUR MEDS BY ELIG CLIN: HCPCS | Performed by: FAMILY MEDICINE

## 2021-05-12 RX ORDER — AMITRIPTYLINE HYDROCHLORIDE 150 MG/1
75 TABLET, FILM COATED ORAL NIGHTLY
Qty: 30 TABLET | Refills: 5
Start: 2021-05-12 | End: 2021-07-26

## 2021-05-12 RX ORDER — VENLAFAXINE HYDROCHLORIDE 37.5 MG/1
37.5 CAPSULE, EXTENDED RELEASE ORAL DAILY
Qty: 30 CAPSULE | Refills: 3 | Status: SHIPPED
Start: 2021-05-12 | End: 2021-06-16

## 2021-05-12 NOTE — PROGRESS NOTES
CC   Chief Complaint   Patient presents with    Discuss Labs    Depression     HPI:   Patient comes in today for the below issue(s). Insulin resistance  Follow up  OBJORDANAN had her on metformin  Labs repeated- A1C is up to 6.2   Serum insulin levels are WNL   Tolerating Metformin, but not at ideal dosing. Is agreeable to a dose increase  Has some fatigue, weight gain  Active but not specifically exercising. Anxiety  Follow up  Patient states up titration of amitriptyline has not been effective for managing her symptoms  She is trying to do meditation along with medication and it is not helping  She is asking if other meds can be tried. She has substantial reaction to SSRI in the past, and is hesitant to reconsider that  She is on propranolol as well for migraines. No SI or HI  Sleep in general is satisfactory        Review of Systems  Review of Systems   Constitutional: Positive for fatigue. Negative for unexpected weight change. Respiratory: Negative for shortness of breath. Cardiovascular: Negative for chest pain. Musculoskeletal: Positive for back pain. Past Medical History:   Diagnosis Date    Blood type, Rh negative     Gestational diabetes     pt takes lantus 28 units nightly    H/O bleeding disorder     Keratosis palmoplantaris with corneal dystrophy ([facility] [address]     Dr Amy Olvera in [address] [place] Kidney stones     MTHFR mutation ([facility]     homozygous         PE:  VS:  /89   Pulse 117   Temp 98.3 °F (36.8 °C) (Temporal)   Resp 18   Ht 5' 6\" (1.676 m)   Wt 213 lb (96.6 kg)   LMP 04/26/2021 (Exact Date)   SpO2 98%   BMI 34.38 kg/m²   Physical Exam  Vitals signs reviewed. Constitutional:       General: She is not in acute distress. Appearance: Normal appearance. Cardiovascular:      Rate and Rhythm: Normal rate and regular rhythm. Pulses: Normal pulses. Heart sounds: No murmur. Pulmonary:      Breath sounds: Normal breath sounds.  No wheezing, rhonchi or rales.   Abdominal:      General: Bowel sounds are normal. There is no distension. Palpations: Abdomen is soft. Tenderness: There is no abdominal tenderness. Musculoskeletal:      Thoracic back: She exhibits normal range of motion and no tenderness. Neurological:      Mental Status: She is alert. Psychiatric:         Attention and Perception: Attention normal.         Mood and Affect: Mood and affect normal.         Cognition and Memory: Cognition and memory normal.         Judgment: Judgment normal.           Assessment (including specific orders/meds/labs):      Alexus Gardner was seen today for discuss labs and depression. Diagnoses and all orders for this visit:    Insulin resistance  -     metFORMIN (GLUCOPHAGE) 850 MG tablet; Take 1 tablet by mouth 2 times daily (with meals)    Anxiety  -     venlafaxine (EFFEXOR XR) 37.5 MG extended release capsule; Take 1 capsule by mouth daily  -     amitriptyline (ELAVIL) 150 MG tablet; Take 0.5 tablets by mouth nightly for 7 days        Plan:     Ongoing issues with insulin resistance. I do not think she is a diabetic yet but she certainly has risk for this. We discussed the benefits of increasing Metformin and she is agreeable. We will repeat her labs at standard intervals. Work on activity level and healthy eating habits. Weight loss would likely be beneficial    Mood issues are ongoing. Limited by the number of medications we can attempt. Discussed possibility of a cross reaction between venlafaxine and SSRIs. She will monitor for reaction. Will decrease dose of Elavil over the next 7 days, before discontinuing. Risks and benefits of medication discussed. Counseling should be considered as well. Counseled regarding above diagnosis, including possible risks and complications,  especially if left uncontrolled.  Counseled regarding the possible sideeffects, risks, benefits and alternatives to treatment; patient and/or guardian verbalizes understanding, agrees, feels comfortable with and wishes to proceed with above treatment plan. Call or go to ED immediately if symptoms worsen or persist. Advised patient to call with any new medication issues, and, as applicable, read all Rx info from pharmacy to assure aware ofall possible risks and side effects of medication before taking. As applicable, educational materials and/or home exercises printed forpatient's review and were included in patient instructions on his/her After Visit Summary and given to patient at the end of visit. Patient and/or guardian given opportunity to ask questions/raise concerns. She verbalized comfort and understanding of instructions. Follow Up:     Return in about 1 month (around 6/12/2021). or sooner if the above issues change unexpectedly or new issues develop     This document was prepared at least partially through the use ofvoice recognition software. Although effort is taken to assure the accuracy of this document, it is possible that grammatical, syntax,  or spelling errors may occur.       Electronically signed by Puneet Dela Cruz MD [facility]

## 2021-05-14 ASSESSMENT — ENCOUNTER SYMPTOMS
BACK PAIN: 1
SHORTNESS OF BREATH: 0

## 2021-06-16 ENCOUNTER — OFFICE VISIT (OUTPATIENT)
Dept: FAMILY MEDICINE CLINIC | Age: 36
End: 2021-06-16
Payer: COMMERCIAL

## 2021-06-16 VITALS
RESPIRATION RATE: 18 BRPM | TEMPERATURE: 97.6 F | WEIGHT: 214 LBS | HEART RATE: 85 BPM | OXYGEN SATURATION: 98 % | BODY MASS INDEX: 34.39 KG/M2 | SYSTOLIC BLOOD PRESSURE: 131 MMHG | HEIGHT: 66 IN | DIASTOLIC BLOOD PRESSURE: 76 MMHG

## 2021-06-16 DIAGNOSIS — G47.00 INSOMNIA, UNSPECIFIED TYPE: ICD-10-CM

## 2021-06-16 DIAGNOSIS — E88.81 INSULIN RESISTANCE: ICD-10-CM

## 2021-06-16 DIAGNOSIS — F41.8 ANXIETY WITH DEPRESSION: Primary | ICD-10-CM

## 2021-06-16 PROCEDURE — 99213 OFFICE O/P EST LOW 20 MIN: CPT | Performed by: FAMILY MEDICINE

## 2021-06-16 PROCEDURE — G8417 CALC BMI ABV UP PARAM F/U: HCPCS | Performed by: FAMILY MEDICINE

## 2021-06-16 PROCEDURE — 1036F TOBACCO NON-USER: CPT | Performed by: FAMILY MEDICINE

## 2021-06-16 PROCEDURE — G8427 DOCREV CUR MEDS BY ELIG CLIN: HCPCS | Performed by: FAMILY MEDICINE

## 2021-06-16 RX ORDER — VENLAFAXINE HYDROCHLORIDE 75 MG/1
75 CAPSULE, EXTENDED RELEASE ORAL DAILY
Qty: 30 CAPSULE | Refills: 5 | Status: SHIPPED
Start: 2021-06-16 | End: 2022-01-10

## 2021-06-16 SDOH — ECONOMIC STABILITY: FOOD INSECURITY: WITHIN THE PAST 12 MONTHS, THE FOOD YOU BOUGHT JUST DIDN'T LAST AND YOU DIDN'T HAVE MONEY TO GET MORE.: NEVER TRUE

## 2021-06-16 SDOH — ECONOMIC STABILITY: FOOD INSECURITY: WITHIN THE PAST 12 MONTHS, YOU WORRIED THAT YOUR FOOD WOULD RUN OUT BEFORE YOU GOT MONEY TO BUY MORE.: NEVER TRUE

## 2021-06-16 ASSESSMENT — ENCOUNTER SYMPTOMS
SHORTNESS OF BREATH: 0
BACK PAIN: 1

## 2021-06-16 ASSESSMENT — SOCIAL DETERMINANTS OF HEALTH (SDOH): HOW HARD IS IT FOR YOU TO PAY FOR THE VERY BASICS LIKE FOOD, HOUSING, MEDICAL CARE, AND HEATING?: NOT HARD AT ALL

## 2021-06-16 NOTE — PROGRESS NOTES
CC   Chief Complaint   Patient presents with    Anxiety     HPI:   Patient comes in today for the below issue(s). Insulin resistance  Follow up  Tolerating metformin. No new issues. Feels well, but too soon to have repeat labs     Anxiety  Follow up  Tolerating Effexor, but sleep became an issue so she restarted her amitriptyline at a 75 mg dose. Does not think her mood is back to where it should be. She is appreciating more depression   She is still functional and able to get done what she needs to get done. No SI or HI    Review of Systems  Review of Systems   Constitutional: Positive for fatigue. Negative for unexpected weight change. Respiratory: Negative for shortness of breath. Cardiovascular: Negative for chest pain. Musculoskeletal: Positive for back pain. Past Medical History:   Diagnosis Date    Blood type, Rh negative     Gestational diabetes     pt takes lantus 28 units nightly    H/O bleeding disorder     Keratosis palmoplantaris with corneal dystrophy ([facility] [address]     Dr Diego Horta in [place]     Kidney stones     MTHFR mutation     homozygous         PE:  VS:  /76   Pulse 85   Temp 97.6 °F (36.4 °C) (Temporal)   Resp 18   Ht 5' 6\" (1.676 m)   Wt 214 lb (97.1 kg)   LMP 05/29/2021 (Exact Date)   SpO2 98%   BMI 34.54 kg/m²   Physical Exam  Vitals reviewed. Constitutional:       General: She is not in acute distress. Appearance: Normal appearance. Cardiovascular:      Rate and Rhythm: Normal rate and regular rhythm. Pulses: Normal pulses. Heart sounds: No murmur heard. Pulmonary:      Breath sounds: Normal breath sounds. No wheezing, rhonchi or rales. Abdominal:      General: Bowel sounds are normal. There is no distension. Palpations: Abdomen is soft. Tenderness: There is no abdominal tenderness. Musculoskeletal:      Thoracic back: No tenderness. Normal range of motion. Neurological:      Mental Status: She is alert. Psychiatric:         Attention and Perception: Attention normal.         Mood and Affect: Mood and affect normal.         Cognition and Memory: Cognition and memory normal.         Judgment: Judgment normal.           Assessment (including specific orders/meds/labs):      Shanda Ibanez was seen today for anxiety. Diagnoses and all orders for this visit:    Anxiety with depression  -     venlafaxine (EFFEXOR XR) 75 MG extended release capsule; Take 1 capsule by mouth daily    Insomnia, unspecified type    Insulin resistance        Plan:     Patient's mood is not where it was before. We discussed simply returning to full dose of amitriptyline versus titrating up Effexor. She is agreeable to uptitrating Effexor. She will continue to use amitriptyline as needed to help her sleep. Continue that at a lower dose. Encouraged counseling. Will reassess as below or sooner if needed. Advised that it will take more time for the higher dose of Metformin to reveal benefit for her insulin resistance. Mood issues are ongoing. Limited by the number of medications we can attempt. Discussed possibility of a cross reaction between venlafaxine and SSRIs. She will monitor for reaction. Will decrease dose of Elavil over the next 7 days, before discontinuing. Risks and benefits of medication discussed. Counseling should be considered as well. Counseled regarding above diagnosis, including possible risks and complications,  especially if left uncontrolled. Counseled regarding the possible sideeffects, risks, benefits and alternatives to treatment; patient and/or guardian verbalizes understanding, agrees, feels comfortable with and wishes to proceed with above treatment plan. Patient and/or guardian given opportunity to ask questions/raise concerns. She verbalized comfort and understanding of instructions. Follow Up:     Return in about 3 weeks (around 7/7/2021) for anxiety- VV.  or sooner if the above issues

## 2021-07-13 ENCOUNTER — VIRTUAL VISIT (OUTPATIENT)
Dept: FAMILY MEDICINE CLINIC | Age: 36
End: 2021-07-13
Payer: COMMERCIAL

## 2021-07-13 DIAGNOSIS — E88.81 INSULIN RESISTANCE: ICD-10-CM

## 2021-07-13 DIAGNOSIS — G47.00 INSOMNIA, UNSPECIFIED TYPE: ICD-10-CM

## 2021-07-13 DIAGNOSIS — F41.8 ANXIETY WITH DEPRESSION: Primary | ICD-10-CM

## 2021-07-13 PROCEDURE — G8427 DOCREV CUR MEDS BY ELIG CLIN: HCPCS | Performed by: FAMILY MEDICINE

## 2021-07-13 PROCEDURE — 99213 OFFICE O/P EST LOW 20 MIN: CPT | Performed by: FAMILY MEDICINE

## 2021-07-13 NOTE — PROGRESS NOTES
TeleMedicine Patient Consent    This visit was performed as a virtual video visit using a synchronous, two-way, audio-video telehealth technology platform. Patient identification was verified at the start of the visit, including the patient's telephone number and physical location. I discussed with the patient the nature of our telehealth visits, that:     1. Due to the nature of an audio- video modality, the only components of a physical exam that could be done are the elements supported by direct observation. 2. The provider will evaluate the patient and recommend diagnostics and treatments based on their assessment. 3. If it was felt that the patient should be evaluated in clinic or an emergency room setting, then they would be directed there. 4. Our sessions are not being recorded and that personal health information is protected. 5. Our team would provide follow up care in person if/when the patient needs it. Patient does agree to proceed with telemedicine consultation. Patient location: home address in [facility]    Physician location: regular office location    This visit was completed virtually using Doxy. me    This visit was performed during the 0481 public health crisis and COVID-19 pandemic.   *Add GT modifier to all Video Visits*

## 2021-07-13 NOTE — PROGRESS NOTES
2021    TELEHEALTH EVALUATION -- Audio/Visual (During CXSNW-40 public health emergency)    HPI:    Terrie Paulino (:  1985) has requested an audio/video evaluation. Consent obtained per MA note, with attention to limitations inherent to a video visit for the following concern(s): Anxiety recheck  Started on Effexor at a higher dose 1 month ago   She is reporting a dramatic improvement on her current symptoms  She reports fewer episodes of feeling depressed or demotivated  Denies any medication side effects  Feels that her mood energy and disposition have overall improved  She is pleased with this progress and wishes to continue  She still is using amitriptyline for sleep. She is hesitant to stop using it. Review of Systems    Prior to Visit Medications    Medication Sig Taking? Authorizing Provider   venlafaxine (EFFEXOR XR) 75 MG extended release capsule Take 1 capsule by mouth daily Yes Ezekiel Weaver MD   metFORMIN (GLUCOPHAGE) 850 MG tablet Take 1 tablet by mouth 2 times daily (with meals) Yes Ezekiel Weaver MD   propranolol (INDERAL LA) 60 MG extended release capsule Take 1 capsule by mouth daily Yes Ezekiel Weaver MD   VIRT-AR 2.2-25-1 MG TABS tablet TAKE 1 TABLET BY MOUTH DAILY Yes Henrique Anderson MD   ibuprofen (ADVIL;MOTRIN) 200 MG tablet Take 200 mg by mouth every 6 hours as needed for Pain Yes Historical Provider, MD   aspirin 81 MG tablet Take 81 mg by mouth every evening  Yes Historical Provider, MD   amitriptyline (ELAVIL) 150 MG tablet Take 0.5 tablets by mouth nightly for 7 days  Ezekiel Weaver MD       Social History     Tobacco Use    Smoking status: Former Smoker     Start date: 1994     Quit date: 10/23/2012     Years since quittin.7    Smokeless tobacco: Never Used   Substance Use Topics    Alcohol use: Yes     Alcohol/week: 0.0 standard drinks     Comment: rare    Drug use:  No            PHYSICAL EXAMINATION:  Vital Signs: (As obtained by patient/caregiver or practitioner observation)    Physical Exam  Constitutional:       Appearance: Normal appearance. She is not toxic-appearing. Neurological:      Mental Status: She is alert. Psychiatric:         Mood and Affect: Mood normal.         Behavior: Behavior normal.         Thought Content: Thought content normal.         Judgment: Judgment normal.           Other pertinent observable physical exam findings-     Due to this being a TeleHealth encounter, evaluation of the following organ systems is limited: Vitals/Constitutional/EENT/Resp/CV/GI//MS/Neuro/Skin/Heme-Lymph-Imm. ASSESSMENT/PLAN:  Judith Preston was seen today for anxiety. Diagnoses and all orders for this visit:    Anxiety with depression    Insomnia, unspecified type      Plan  Mood issues are stable. She will continue current medication. No changes being recommended. To notify if mood symptoms decompensate. Insomnia is stable on her current regimen. No changes being recommended. She will need labs done before her next visit    No follow-ups on file. An  electronic signature was used to authenticate this note. --Noah Garrido MD on 7/13/2021 at 5:33 PM        Pursuant to the emergency declaration under the [facility], [facility]5 waiver authority and the [employer] Act, this Virtual  Visit was conducted, with patient's consent, to reduce the patient's risk of exposure to COVID-19 and provide continuity of care for an established patient. Services were provided through a video synchronous discussion virtually to substitute for in-person clinic visit.

## 2021-11-16 RX ORDER — PROPRANOLOL HCL 60 MG
60 CAPSULE, EXTENDED RELEASE 24HR ORAL DAILY
Qty: 30 CAPSULE | Refills: 5 | Status: SHIPPED
Start: 2021-11-16 | End: 2022-01-19

## 2021-12-27 ENCOUNTER — PATIENT MESSAGE (OUTPATIENT)
Dept: FAMILY MEDICINE CLINIC | Age: 36
End: 2021-12-27

## 2021-12-27 NOTE — TELEPHONE ENCOUNTER
From: Jorge Reyes  To: Dr. Kimberli Denny: 12/27/2021 9:57 AM EST  Subject: Medicaition    Good morning,  I was wondering if it would be ok to stop my Propranolol 60 MG. I have started taking it every other day to see if I was still having any of the issues I was having when I started this. I didn't notice anything so I stopped taking it and I haven't had any issues being off of it. My script is due for a refill and I was hoping to stop all together. I do come see you on Jan 19th and can make sure I am not having any issues.

## 2021-12-27 NOTE — TELEPHONE ENCOUNTER
I am covering Dr. Yecenia Reed right now. It does seem reasonable after weaning that you can just stop the propranolol. Please go ahead and do that and discuss any problems at your appointment on the 19th. If you need restarted sooner please phone back and 1 of us can get a prescription called in.

## 2022-01-10 DIAGNOSIS — F41.8 ANXIETY WITH DEPRESSION: ICD-10-CM

## 2022-01-10 RX ORDER — VENLAFAXINE HYDROCHLORIDE 75 MG/1
75 CAPSULE, EXTENDED RELEASE ORAL DAILY
Qty: 30 CAPSULE | Refills: 5 | Status: SHIPPED
Start: 2022-01-10 | End: 2022-07-12

## 2022-01-19 ENCOUNTER — OFFICE VISIT (OUTPATIENT)
Dept: FAMILY MEDICINE CLINIC | Age: 37
End: 2022-01-19
Payer: COMMERCIAL

## 2022-01-19 VITALS
DIASTOLIC BLOOD PRESSURE: 75 MMHG | SYSTOLIC BLOOD PRESSURE: 119 MMHG | RESPIRATION RATE: 18 BRPM | WEIGHT: 184 LBS | TEMPERATURE: 98.4 F | OXYGEN SATURATION: 98 % | HEIGHT: 66 IN | HEART RATE: 83 BPM | BODY MASS INDEX: 29.57 KG/M2

## 2022-01-19 DIAGNOSIS — Z15.89 MTHFR MUTATION: ICD-10-CM

## 2022-01-19 DIAGNOSIS — F41.8 ANXIETY WITH DEPRESSION: Primary | ICD-10-CM

## 2022-01-19 DIAGNOSIS — E88.81 INSULIN RESISTANCE: ICD-10-CM

## 2022-01-19 DIAGNOSIS — G89.29 CHRONIC LEFT-SIDED LOW BACK PAIN WITH LEFT-SIDED SCIATICA: ICD-10-CM

## 2022-01-19 DIAGNOSIS — M51.24 THORACIC DISC HERNIATION: ICD-10-CM

## 2022-01-19 DIAGNOSIS — M54.42 CHRONIC LEFT-SIDED LOW BACK PAIN WITH LEFT-SIDED SCIATICA: ICD-10-CM

## 2022-01-19 DIAGNOSIS — G47.00 INSOMNIA, UNSPECIFIED TYPE: ICD-10-CM

## 2022-01-19 PROCEDURE — 1036F TOBACCO NON-USER: CPT | Performed by: FAMILY MEDICINE

## 2022-01-19 PROCEDURE — G8417 CALC BMI ABV UP PARAM F/U: HCPCS | Performed by: FAMILY MEDICINE

## 2022-01-19 PROCEDURE — 99214 OFFICE O/P EST MOD 30 MIN: CPT | Performed by: FAMILY MEDICINE

## 2022-01-19 PROCEDURE — G8427 DOCREV CUR MEDS BY ELIG CLIN: HCPCS | Performed by: FAMILY MEDICINE

## 2022-01-19 PROCEDURE — G8484 FLU IMMUNIZE NO ADMIN: HCPCS | Performed by: FAMILY MEDICINE

## 2022-01-19 ASSESSMENT — ENCOUNTER SYMPTOMS
BLOOD IN STOOL: 0
TROUBLE SWALLOWING: 0
BACK PAIN: 1
SHORTNESS OF BREATH: 0
ABDOMINAL PAIN: 0

## 2022-01-19 NOTE — PROGRESS NOTES
CC   Chief Complaint   Patient presents with   [address] [place] [address] Maintenance     declines flu vaccine     HPI:   Patient comes in today for the below issue(s). Insulin resistance  Follow up  Tolerating metformin. No new issues. Feels well  Has been working to lose weight- doing HIIT training. Pleased with progress- 25 lbs since last year. Thoracic disc herniation  Follow up  Chronic issue, present for years. Chronic pain, seen by specialists in the past.    Pain worse since resumed exercising  Working with chiropractor and massotherapy   Does not want further therapy. Dizziness  Follow up  Prior issue with neurocardiogenic syncope  Took self off of B-blocker because of improved health overall    Anxiety & insomnia  Follow up  Was doing well around 6 months ago after dose of Effexor was increased. .  Feels doing well overall. No concerns today. She is still functional and able to get done what she needs to get done. No SI or HI  Sleep is controlled on daily Elavil     Review of Systems  Review of Systems   Constitutional: Positive for fatigue. Negative for unexpected weight change. HENT: Negative for trouble swallowing. Respiratory: Negative for shortness of breath. Cardiovascular: Negative for chest pain. Gastrointestinal: Negative for abdominal pain and blood in stool. Genitourinary: Negative for difficulty urinating. Musculoskeletal: Positive for back pain. Negative for arthralgias.           Past Medical History:   Diagnosis Date    Blood type, Rh negative     Gestational diabetes     pt takes lantus 28 units nightly    H/O bleeding disorder     Keratosis palmoplantaris with corneal dystrophy ([facility] [address]     Dr Marjan Myers in [place]     Kidney stones     MTHFR mutation     homozygous         PE:  VS:  /75   Pulse 83   Temp 98.4 °F (36.9 °C) (Temporal)   Resp 18   Ht 5' 6\" (1.676 m)   Wt 184 lb (83.5 kg)   LMP 12/18/2021 (Exact Date)   SpO2 98%   BMI 29.70 kg/m²   Physical Exam  Vitals reviewed. Constitutional:       General: She is not in acute distress. Appearance: Normal appearance. Cardiovascular:      Rate and Rhythm: Normal rate and regular rhythm. Pulses: Normal pulses. Heart sounds: No murmur heard. Pulmonary:      Breath sounds: Normal breath sounds. No wheezing, rhonchi or rales. Abdominal:      General: Bowel sounds are normal. There is no distension. Palpations: Abdomen is soft. Tenderness: There is no abdominal tenderness. Musculoskeletal:      Thoracic back: Tenderness (midline) present. No deformity. Normal range of motion. Lumbar back: Normal.   Neurological:      Mental Status: She is alert. Psychiatric:         Attention and Perception: Attention normal.         Mood and Affect: Mood and affect normal.         Thought Content: Thought content normal.         Cognition and Memory: Cognition and memory normal.         Judgment: Judgment normal.           Assessment (including specific orders/meds/labs):      Jarek Hoff was seen today for anxiety and health maintenance. Diagnoses and all orders for this visit:    Anxiety with depression  -     Comprehensive Metabolic Panel; Future  -     TSH without Reflex; Future    Insomnia, unspecified type    Insulin resistance  -     Hemoglobin A1C; Future  -     Comprehensive Metabolic Panel; Future  -     TSH without Reflex; Future    Chronic left-sided low back pain with left-sided sciatica    Thoracic disc herniation    MTHFR mutation  -     CBC Auto Differential; Future        Plan:     Overall doing well. No new issues  Weight loss is impressive- congratulated her on efforts. Offered referral for spine issues if desires- wants to hold off for now. Mood is stable. No new changes. Same meds at same doses  Check labs. If insulin resistance improving, consider adjusting metformin.       Counseled regarding above diagnosis, including possible risks and complications,  especially if left uncontrolled. Counseled regarding the possible sideeffects, risks, benefits and alternatives to treatment; patient and/or guardian verbalizes understanding, agrees, feels comfortable with and wishes to proceed with above treatment plan. Patient and/or guardian given opportunity to ask questions/raise concerns. She verbalized comfort and understanding of instructions. Follow Up:     Return in about 6 months (around 7/19/2022), or if symptoms worsen or fail to improve, for anxiety and back pain . or sooner if the above issues change unexpectedly or new issues develop     This document was prepared at least partially through the use ofFiiilingice recognition software. Although effort is taken to assure the accuracy of this document, it is possible that grammatical, syntax,  or spelling errors may occur.       Electronically signed by Valeriy Gagnon MD [facility]

## 2022-01-27 DIAGNOSIS — F41.9 ANXIETY: ICD-10-CM

## 2022-01-27 RX ORDER — AMITRIPTYLINE HYDROCHLORIDE 75 MG/1
75 TABLET, FILM COATED ORAL NIGHTLY PRN
Qty: 90 TABLET | Refills: 1 | Status: SHIPPED
Start: 2022-01-27 | End: 2022-08-03

## 2022-02-02 ENCOUNTER — HOSPITAL ENCOUNTER (OUTPATIENT)
Age: 37
Discharge: HOME | End: 2022-02-02
Payer: COMMERCIAL

## 2022-02-02 DIAGNOSIS — F41.8 ANXIETY WITH DEPRESSION: ICD-10-CM

## 2022-02-02 DIAGNOSIS — E88.81 INSULIN RESISTANCE: ICD-10-CM

## 2022-02-02 LAB
ALBUMIN SERPL-MCNC: 4.6 G/DL (ref 3.5–5.2)
ALP BLD-CCNC: 63 U/L (ref 35–104)
ALT SERPL-CCNC: 15 U/L (ref 0–32)
ANION GAP SERPL CALCULATED.3IONS-SCNC: 10 MMOL/L (ref 7–16)
AST SERPL-CCNC: 17 U/L (ref 0–31)
BASOPHILS ABSOLUTE: 0.05 E9/L (ref 0–0.2)
BASOPHILS RELATIVE PERCENT: 0.8 % (ref 0–2)
BILIRUB SERPL-MCNC: 0.3 MG/DL (ref 0–1.2)
BUN BLDV-MCNC: 10 MG/DL (ref 6–20)
CALCIUM SERPL-MCNC: 9.3 MG/DL (ref 8.6–10.2)
CHLORIDE BLD-SCNC: 99 MMOL/L (ref 98–107)
CO2: 26 MMOL/L (ref 22–29)
CREAT SERPL-MCNC: 0.7 MG/DL (ref 0.5–1)
EOSINOPHILS ABSOLUTE: 0.06 E9/L (ref 0.05–0.5)
EOSINOPHILS RELATIVE PERCENT: 0.9 % (ref 0–6)
GFR AFRICAN AMERICAN: >60
GFR NON-AFRICAN AMERICAN: >60 ML/MIN/1.73
GLUCOSE BLD-MCNC: 102 MG/DL (ref 74–99)
HBA1C MFR BLD: 5.2 % (ref 4–5.6)
HCT VFR BLD CALC: 38.7 % (ref 34–48)
HEMOGLOBIN: 12.9 G/DL (ref 11.5–15.5)
IMMATURE GRANULOCYTES #: 0.01 E9/L
IMMATURE GRANULOCYTES %: 0.2 % (ref 0–5)
LYMPHOCYTES ABSOLUTE: 2.29 E9/L (ref 1.5–4)
LYMPHOCYTES RELATIVE PERCENT: 35.1 % (ref 20–42)
MCH RBC QN AUTO: 29.9 PG (ref 26–35)
MCHC RBC AUTO-ENTMCNC: 33.3 % (ref 32–34.5)
MCV RBC AUTO: 89.8 FL (ref 80–99.9)
MONOCYTES ABSOLUTE: 0.35 E9/L (ref 0.1–0.95)
MONOCYTES RELATIVE PERCENT: 5.4 % (ref 2–12)
NEUTROPHILS ABSOLUTE: 3.76 E9/L (ref 1.8–7.3)
NEUTROPHILS RELATIVE PERCENT: 57.6 % (ref 43–80)
PDW BLD-RTO: 12.4 FL (ref 11.5–15)
PLATELET # BLD: 235 E9/L (ref 130–450)
PMV BLD AUTO: 9.9 FL (ref 7–12)
POTASSIUM SERPL-SCNC: 4.1 MMOL/L (ref 3.5–5)
RBC # BLD: 4.31 E12/L (ref 3.5–5.5)
SODIUM BLD-SCNC: 135 MMOL/L (ref 132–146)
TOTAL PROTEIN: 7.6 G/DL (ref 6.4–8.3)
TSH SERPL DL<=0.05 MIU/L-ACNC: 1.43 UIU/ML (ref 0.27–4.2)
WBC # BLD: 6.5 E9/L (ref 4.5–11.5)

## 2022-02-02 PROCEDURE — 80053 COMPREHEN METABOLIC PANEL: CPT

## 2022-02-02 PROCEDURE — 36415 COLL VENOUS BLD VENIPUNCTURE: CPT

## 2022-02-02 PROCEDURE — 85025 COMPLETE CBC W/AUTO DIFF WBC: CPT

## 2022-02-02 PROCEDURE — 83036 HEMOGLOBIN GLYCOSYLATED A1C: CPT

## 2022-02-02 PROCEDURE — 84443 ASSAY THYROID STIM HORMONE: CPT

## 2022-06-27 ENCOUNTER — OFFICE VISIT (OUTPATIENT)
Dept: FAMILY MEDICINE CLINIC | Age: 37
End: 2022-06-27
Payer: COMMERCIAL

## 2022-06-27 VITALS
HEART RATE: 82 BPM | DIASTOLIC BLOOD PRESSURE: 80 MMHG | OXYGEN SATURATION: 99 % | SYSTOLIC BLOOD PRESSURE: 128 MMHG | BODY MASS INDEX: 28.45 KG/M2 | WEIGHT: 177 LBS | HEIGHT: 66 IN

## 2022-06-27 DIAGNOSIS — T14.90XA TRAUMA: Primary | ICD-10-CM

## 2022-06-27 DIAGNOSIS — L03.116 LEFT LEG CELLULITIS: ICD-10-CM

## 2022-06-27 PROCEDURE — 99213 OFFICE O/P EST LOW 20 MIN: CPT | Performed by: FAMILY MEDICINE

## 2022-06-27 PROCEDURE — 1036F TOBACCO NON-USER: CPT | Performed by: FAMILY MEDICINE

## 2022-06-27 PROCEDURE — G8427 DOCREV CUR MEDS BY ELIG CLIN: HCPCS | Performed by: FAMILY MEDICINE

## 2022-06-27 PROCEDURE — G8417 CALC BMI ABV UP PARAM F/U: HCPCS | Performed by: FAMILY MEDICINE

## 2022-06-27 RX ORDER — CLINDAMYCIN PHOSPHATE 10 MG/G
GEL TOPICAL
COMMUNITY
Start: 2022-05-18

## 2022-06-27 RX ORDER — CLASCOTERONE 1 G/100G
CREAM TOPICAL
COMMUNITY
Start: 2022-05-19

## 2022-06-27 RX ORDER — DOXYCYCLINE 100 MG/1
100 TABLET ORAL 2 TIMES DAILY
Qty: 20 TABLET | Refills: 0 | Status: SHIPPED | OUTPATIENT
Start: 2022-06-27 | End: 2022-07-07

## 2022-06-27 ASSESSMENT — ENCOUNTER SYMPTOMS: COLOR CHANGE: 1

## 2022-06-27 NOTE — PROGRESS NOTES
Jory Alvarez (:  1985) is a 40 y.o. female,Established patient, here for evaluation of the following chief complaint(s):  Fall (22. fall on gravel), Ankle Injury, and Foot Injury (bottom of foot, states deep cut)         ASSESSMENT/PLAN:  1. Trauma  -     XR ANKLE LEFT (MIN 3 VIEWS); Future  2. Left leg cellulitis  -     doxycycline monohydrate (ADOXA) 100 MG tablet; Take 1 tablet by mouth 2 times daily for 10 days, Disp-20 tablet, R-0Normal  -     mupirocin (BACTROBAN) 2 % ointment; Apply topically 3 times daily. , Disp-22 g, R-0, Normal  Initial review of x-ray shows no fracture or dislocation. Questionable subcutaneous foreign body. Final read per radiologist.  We will treat for cellulitis. Also keep the affected limb compressed and elevated is much as possible. Red flags discussed with patient. These occur she is to go directly to the nearest emergency department for further evaluation and treatment. No follow-ups on file. Subjective   SUBJECTIVE/OBJECTIVE:  HPI  Patient presents today for evaluation regarding recent fall. Patient states that she was on gravel yesterday when she rolled her ankle. She fell onto the lateral aspect of her left lower extremity. Since that time there has been swelling, erythema, and pain to the lateral malleolus. Also has a small cut on the plantar aspect of the fifth metatarsal head. No fever or chills. No other injuries. Review of Systems   Skin: Positive for color change and wound. All other systems reviewed and are negative. Current Outpatient Medications:     WINLEVI 1 % CREA, APPLY TO AFFECTED AREA TWICE DAILY, Disp: , Rfl:     clindamycin (CLINDAGEL) 1 % gel, APPLY TO AFFECTED AREA EVERY NIGHT AT BEDTIME, Disp: , Rfl:     doxycycline monohydrate (ADOXA) 100 MG tablet, Take 1 tablet by mouth 2 times daily for 10 days, Disp: 20 tablet, Rfl: 0    mupirocin (BACTROBAN) 2 % ointment, Apply topically 3 times daily. , Disp: 22 g, Rfl: 0    amitriptyline (ELAVIL) 75 MG tablet, Take 1 tablet by mouth nightly as needed for Sleep, Disp: 90 tablet, Rfl: 1    venlafaxine (EFFEXOR XR) 75 MG extended release capsule, TAKE 1 CAPSULE BY MOUTH DAILY, Disp: 30 capsule, Rfl: 5    folic acid-pyridoxine-cyanocobalamine (VIRT-AR) 2.2-25-1 MG TABS tablet, Take 1 tablet by mouth daily, Disp: 30 tablet, Rfl: 11    ibuprofen (ADVIL;MOTRIN) 200 MG tablet, Take 200 mg by mouth every 6 hours as needed for Pain, Disp: , Rfl:     aspirin 81 MG tablet, Take 81 mg by mouth every evening , Disp: , Rfl:     metFORMIN (GLUCOPHAGE) 850 MG tablet, Take 1 tablet by mouth 2 times daily (with meals) (Patient not taking: Reported on 2022), Disp: 180 tablet, Rfl: 1   Patient Active Problem List   Diagnosis    MTHFR mutation    Osteophyte of vertebrae    Left-sided low back pain with left-sided sciatica    Myofascial pain    Thoracic disc herniation    Anxiety    Neurocardiogenic syncope     Past Medical History:   Diagnosis Date    Blood type, Rh negative     Gestational diabetes     pt takes lantus 28 units nightly    H/O bleeding disorder     Keratosis palmoplantaris with corneal dystrophy ([facility])     Dr Cathy Damico in [place]     Kidney stones     MTHFR mutation     homozygous     Past Surgical History:   Procedure Laterality Date    DILATION AND CURETTAGE      FACIAL COSMETIC SURGERY      mole removal    KIDNEY STONE SURGERY  2016    lithotripsy     Social History     Socioeconomic History    Marital status:      Spouse name: Not on file    Number of children: Not on file    Years of education: Not on file    Highest education level: Not on file   Occupational History    Not on file   Tobacco Use    Smoking status: Former Smoker     Start date: 1994     Quit date: 10/23/2012     Years since quittin.6    Smokeless tobacco: Never Used   Substance and Sexual Activity    Alcohol use:  Yes     Alcohol/week: 0.0 standard drinks     Comment: rare    Drug use: No    Sexual activity: Yes     Partners: Male     Birth control/protection: Other-see comments     Comment: -vasectomy   Other Topics Concern    Not on file   Social History Narrative    Not on file     Social Determinants of Health     Financial Resource Strain:     Difficulty of Paying Living Expenses: Not on file   Food Insecurity:     Worried About Running Out of Food in the Last Year: Not on file    Awais of Food in the Last Year: Not on file   Transportation Needs:     Lack of Transportation (Medical): Not on file    Lack of Transportation (Non-Medical): Not on file   Physical Activity:     Days of Exercise per Week: Not on file    Minutes of Exercise per Session: Not on file   Stress:     Feeling of Stress : Not on file   Social Connections:     Frequency of Communication with Friends and Family: Not on file    Frequency of Social Gatherings with Friends and Family: Not on file    Attends [employer]ism Services: Not on file    Active Member of [address] [place] [address] [employer] or Organizations: Not on file    Attends Club or Organization Meetings: Not on file    Marital Status: Not on file   Intimate Partner Violence:     Fear of Current or Ex-Partner: Not on file    Emotionally Abused: Not on file    Physically Abused: Not on file    Sexually Abused: Not on file   Housing Stability:     Unable to Pay for Housing in the Last Year: Not on file    Number of [place] in the Last Year: Not on file    Unstable Housing in the Last Year: Not on file     Family History   Problem Relation Age of Onset    Cancer Father         throat cancer     Other Father         brain aneurysm    Diabetes Paternal Grandmother         + INSULIN    Heart Disease Brother         anomalous vascular issues       There are no preventive care reminders to display for this patient. There are no preventive care reminders to display for this patient.    There are no preventive care reminders to display for this patient. There are no preventive care reminders to display for this patient. Health Maintenance   Topic Date Due    Varicella vaccine (1 of 2 - 2-dose childhood series) Never done    COVID-19 Vaccine (1) Never done    HIV screen  Never done    Hepatitis C screen  Never done    Depression Monitoring  02/03/2022    Flu vaccine (Season Ended) 09/01/2022    Cervical cancer screen  12/18/2024    Diabetes screen  02/02/2025    DTaP/Tdap/Td vaccine (3 - Td or Tdap) 08/06/2025    Hepatitis A vaccine  Aged Out    Hepatitis B vaccine  Aged Out    Hib vaccine  Aged Out    Meningococcal (ACWY) vaccine  Aged Out    Pneumococcal 0-64 years Vaccine  Aged Out      There are no preventive care reminders to display for this patient. There are no preventive care reminders to display for this patient. /80   Pulse 82   Ht 5' 6\" (1.676 m)   Wt 177 lb (80.3 kg)   SpO2 99%   BMI 28.57 kg/m²     Objective   Physical Exam  Vitals reviewed. Constitutional:       Appearance: Normal appearance. HENT:      Head: Normocephalic and atraumatic. Eyes:      Extraocular Movements: Extraocular movements intact. Pupils: Pupils are equal, round, and reactive to light. Cardiovascular:      Rate and Rhythm: Normal rate. Pulmonary:      Effort: Pulmonary effort is normal.   Musculoskeletal:         General: Tenderness and signs of injury present. Left lower leg: Edema present. Feet:    Skin:     General: Skin is warm and dry. Neurological:      General: No focal deficit present. Mental Status: She is alert. Psychiatric:         Mood and Affect: Mood normal.                  An electronic signature was used to authenticate this note.     --Margaret Broussard DO

## 2022-07-06 ENCOUNTER — HOSPITAL ENCOUNTER (OUTPATIENT)
Age: 37
Discharge: HOME | End: 2022-07-06
Payer: COMMERCIAL

## 2022-07-06 DIAGNOSIS — E88.81 INSULIN RESISTANCE: ICD-10-CM

## 2022-07-06 LAB
ALBUMIN SERPL-MCNC: 4.4 G/DL (ref 3.5–5.2)
ALP BLD-CCNC: 73 U/L (ref 35–104)
ALT SERPL-CCNC: 16 U/L (ref 0–32)
ANION GAP SERPL CALCULATED.3IONS-SCNC: 11 MMOL/L (ref 7–16)
AST SERPL-CCNC: 22 U/L (ref 0–31)
BILIRUB SERPL-MCNC: 0.5 MG/DL (ref 0–1.2)
BUN BLDV-MCNC: 11 MG/DL (ref 6–20)
CALCIUM SERPL-MCNC: 9 MG/DL (ref 8.6–10.2)
CHLORIDE BLD-SCNC: 101 MMOL/L (ref 98–107)
CO2: 26 MMOL/L (ref 22–29)
CREAT SERPL-MCNC: 0.7 MG/DL (ref 0.5–1)
GFR AFRICAN AMERICAN: >60
GFR NON-AFRICAN AMERICAN: >60 ML/MIN/1.73
GLUCOSE BLD-MCNC: 102 MG/DL (ref 74–99)
HBA1C MFR BLD: 5 % (ref 4–5.6)
POTASSIUM SERPL-SCNC: 4 MMOL/L (ref 3.5–5)
SODIUM BLD-SCNC: 138 MMOL/L (ref 132–146)
TOTAL PROTEIN: 7.5 G/DL (ref 6.4–8.3)

## 2022-07-06 PROCEDURE — 80053 COMPREHEN METABOLIC PANEL: CPT

## 2022-07-06 PROCEDURE — 36415 COLL VENOUS BLD VENIPUNCTURE: CPT

## 2022-07-06 PROCEDURE — 83036 HEMOGLOBIN GLYCOSYLATED A1C: CPT

## 2022-07-11 DIAGNOSIS — F41.8 ANXIETY WITH DEPRESSION: ICD-10-CM

## 2022-07-12 RX ORDER — VENLAFAXINE HYDROCHLORIDE 75 MG/1
75 CAPSULE, EXTENDED RELEASE ORAL DAILY
Qty: 30 CAPSULE | Refills: 5 | Status: SHIPPED | OUTPATIENT
Start: 2022-07-12

## 2022-07-13 ENCOUNTER — OFFICE VISIT (OUTPATIENT)
Dept: FAMILY MEDICINE CLINIC | Age: 37
End: 2022-07-13
Payer: COMMERCIAL

## 2022-07-13 VITALS
SYSTOLIC BLOOD PRESSURE: 119 MMHG | HEART RATE: 87 BPM | TEMPERATURE: 98.2 F | RESPIRATION RATE: 16 BRPM | DIASTOLIC BLOOD PRESSURE: 71 MMHG | HEIGHT: 66 IN | BODY MASS INDEX: 28.45 KG/M2 | OXYGEN SATURATION: 98 % | WEIGHT: 177 LBS

## 2022-07-13 DIAGNOSIS — Z23 NEED FOR DIPHTHERIA-TETANUS-PERTUSSIS (TDAP) VACCINE: ICD-10-CM

## 2022-07-13 DIAGNOSIS — L03.116 LEFT LEG CELLULITIS: Primary | ICD-10-CM

## 2022-07-13 DIAGNOSIS — S96.912D STRAIN OF LEFT ANKLE, SUBSEQUENT ENCOUNTER: ICD-10-CM

## 2022-07-13 DIAGNOSIS — M51.24 THORACIC DISC HERNIATION: ICD-10-CM

## 2022-07-13 DIAGNOSIS — E88.81 INSULIN RESISTANCE: ICD-10-CM

## 2022-07-13 DIAGNOSIS — F41.8 ANXIETY WITH DEPRESSION: ICD-10-CM

## 2022-07-13 PROCEDURE — 1036F TOBACCO NON-USER: CPT | Performed by: FAMILY MEDICINE

## 2022-07-13 PROCEDURE — G8427 DOCREV CUR MEDS BY ELIG CLIN: HCPCS | Performed by: FAMILY MEDICINE

## 2022-07-13 PROCEDURE — 90471 IMMUNIZATION ADMIN: CPT | Performed by: FAMILY MEDICINE

## 2022-07-13 PROCEDURE — 99214 OFFICE O/P EST MOD 30 MIN: CPT | Performed by: FAMILY MEDICINE

## 2022-07-13 PROCEDURE — 90715 TDAP VACCINE 7 YRS/> IM: CPT | Performed by: FAMILY MEDICINE

## 2022-07-13 PROCEDURE — G8417 CALC BMI ABV UP PARAM F/U: HCPCS | Performed by: FAMILY MEDICINE

## 2022-07-13 SDOH — ECONOMIC STABILITY: FOOD INSECURITY: WITHIN THE PAST 12 MONTHS, THE FOOD YOU BOUGHT JUST DIDN'T LAST AND YOU DIDN'T HAVE MONEY TO GET MORE.: NEVER TRUE

## 2022-07-13 SDOH — ECONOMIC STABILITY: FOOD INSECURITY: WITHIN THE PAST 12 MONTHS, YOU WORRIED THAT YOUR FOOD WOULD RUN OUT BEFORE YOU GOT MONEY TO BUY MORE.: NEVER TRUE

## 2022-07-13 ASSESSMENT — PATIENT HEALTH QUESTIONNAIRE - PHQ9
4. FEELING TIRED OR HAVING LITTLE ENERGY: 1
3. TROUBLE FALLING OR STAYING ASLEEP: 0
5. POOR APPETITE OR OVEREATING: 1
SUM OF ALL RESPONSES TO PHQ QUESTIONS 1-9: 2
7. TROUBLE CONCENTRATING ON THINGS, SUCH AS READING THE NEWSPAPER OR WATCHING TELEVISION: 0
SUM OF ALL RESPONSES TO PHQ QUESTIONS 1-9: 2
SUM OF ALL RESPONSES TO PHQ9 QUESTIONS 1 & 2: 0
10. IF YOU CHECKED OFF ANY PROBLEMS, HOW DIFFICULT HAVE THESE PROBLEMS MADE IT FOR YOU TO DO YOUR WORK, TAKE CARE OF THINGS AT HOME, OR GET ALONG WITH OTHER PEOPLE: 0
8. MOVING OR SPEAKING SO SLOWLY THAT OTHER PEOPLE COULD HAVE NOTICED. OR THE OPPOSITE, BEING SO FIGETY OR RESTLESS THAT YOU HAVE BEEN MOVING AROUND A LOT MORE THAN USUAL: 0
9. THOUGHTS THAT YOU WOULD BE BETTER OFF DEAD, OR OF HURTING YOURSELF: 0
6. FEELING BAD ABOUT YOURSELF - OR THAT YOU ARE A FAILURE OR HAVE LET YOURSELF OR YOUR FAMILY DOWN: 0
SUM OF ALL RESPONSES TO PHQ QUESTIONS 1-9: 2
1. LITTLE INTEREST OR PLEASURE IN DOING THINGS: 0
SUM OF ALL RESPONSES TO PHQ QUESTIONS 1-9: 2
2. FEELING DOWN, DEPRESSED OR HOPELESS: 0

## 2022-07-13 ASSESSMENT — ENCOUNTER SYMPTOMS
ABDOMINAL PAIN: 0
BACK PAIN: 1
TROUBLE SWALLOWING: 0

## 2022-07-13 ASSESSMENT — SOCIAL DETERMINANTS OF HEALTH (SDOH): HOW HARD IS IT FOR YOU TO PAY FOR THE VERY BASICS LIKE FOOD, HOUSING, MEDICAL CARE, AND HEATING?: NOT HARD AT ALL

## 2022-07-13 NOTE — PROGRESS NOTES
Michela Bradford is a 40y.o. year old female Established patient, here for evaluation of the following chief complaint(s):    Chief Complaint   Patient presents with    Anxiety    Back Pain    Abdominal Pain     3 days         Raymundo Cook was seen today for anxiety, back pain and abdominal pain. Diagnoses and all orders for this visit:    Left leg cellulitis  -     Tdap, BOOSTRIX, (age 8 yrs+), IM    Strain of left ankle, subsequent encounter    Need for diphtheria-tetanus-pertussis (Tdap) vaccine  -     Tdap, BOOSTRIX, (age 8 yrs+), IM    Insulin resistance  -     Hemoglobin A1C; Future  -     Comprehensive Metabolic Panel; Future    Anxiety with depression    Left leg cellulitis is improving, ankle strain is improving but it has been more than 5 years since her last tetanus. Tetanus updated today. Ankle strain is improving    Insulin resistance issue has resolved, she is doing well even off of metformin. Congratulated her on her efforts. Anxiety symptoms are ongoing, stable. She declines further work-up at this time. She may consider alternative counseling in the future. Thoracic back pain unchanged. Patient/guardian was given the opportunity to ask questions regarding the visit today. Questions were addressed. They verbalized comfort and understanding of the assessment and plan. Return in about 6 months (around 1/13/2023), or if symptoms worsen or fail to improve, for insulin resistance, back pain. SUBJECTIVE/OBJECTIVE:    HPI    Left ankle injury  Occurred at end of June, notes from walk-in care reviewed  Imaging benign, treated for possible cellulitis with oral antibiotics  Here today for follow-up  Swelling has improved dramatically. She is walking without a limp, wearing normal shoes  Cellulitis symptoms are resolving.   She still has some scabbed over abrasions, but is doing well  Last tetanus was over 7 years ago    Insulin resistance  Follow up  At last visit we did wean off metformin. Here today for return visit  Doing well. A1c has declined even further even off metformin   Feels well  Has been working to lose weight- doing HIIT training. Pleased with progress-additional 7 pounds loss since last assessed     Thoracic disc herniation  Follow up  Chronic issue, present for years. Chronic pain, seen by specialists in the past.    Pain is actually somewhat better with her notable weight loss  Working with chiropractor and massotherapy   Does not want further therapy.      Anxiety & insomnia  Follow up  Saw counselor- wasn't to her liking at [address], may consider someone else in the future. States she is too busy this summer to consider at the present time  Has been doing well on current dose of medications. Feels doing reasonably well overall. No concerns today. She is still functional and able to get done what she needs to get done. No SI or HI  Sleep is controlled on daily Elavil     Review of Systems   HENT: Negative for trouble swallowing. Eyes: Negative for visual disturbance. Cardiovascular: Negative for chest pain and palpitations. Gastrointestinal: Negative for abdominal pain. Musculoskeletal: Positive for arthralgias and back pain. Neurological: Negative for dizziness, syncope and weakness. Objective   /71   Pulse 87   Temp 98.2 °F (36.8 °C) (Temporal)   Resp 16   Ht 5' 6\" (1.676 m)   Wt 177 lb (80.3 kg)   LMP 06/26/2022 (Exact Date)   SpO2 98%   BMI 28.57 kg/m²     Physical Exam  Vitals reviewed. Constitutional:       General: She is not in acute distress. Appearance: Normal appearance. Cardiovascular:      Rate and Rhythm: Normal rate and regular rhythm. Pulses: Normal pulses. Heart sounds: No murmur heard. Pulmonary:      Breath sounds: Normal breath sounds. No wheezing, rhonchi or rales. Abdominal:      General: Bowel sounds are normal. There is no distension. Palpations: Abdomen is soft. Tenderness: There is no abdominal tenderness. Musculoskeletal:      Thoracic back: Tenderness (midline) present. No deformity. Normal range of motion. Lumbar back: Normal.      Left ankle: No swelling or deformity. Normal range of motion. Normal pulse. Neurological:      Mental Status: She is alert. Psychiatric:         Attention and Perception: Attention normal.         Mood and Affect: Mood and affect normal.         Thought Content: Thought content normal.         Cognition and Memory: Cognition and memory normal.         Judgment: Judgment normal.              An electronic signature was used to authenticate this note.     --Sandra Luna MD

## 2022-08-03 DIAGNOSIS — F41.9 ANXIETY: ICD-10-CM

## 2022-08-03 RX ORDER — AMITRIPTYLINE HYDROCHLORIDE 75 MG/1
TABLET, FILM COATED ORAL
Qty: 90 TABLET | Refills: 1 | Status: SHIPPED | OUTPATIENT
Start: 2022-08-03

## 2023-01-11 ENCOUNTER — HOSPITAL ENCOUNTER (OUTPATIENT)
Age: 38
Discharge: HOME | End: 2023-01-11
Payer: COMMERCIAL

## 2023-01-11 DIAGNOSIS — E88.81 INSULIN RESISTANCE: ICD-10-CM

## 2023-01-11 LAB
ALBUMIN SERPL-MCNC: 4.3 G/DL (ref 3.5–5.2)
ALP BLD-CCNC: 63 U/L (ref 35–104)
ALT SERPL-CCNC: 17 U/L (ref 0–32)
ANION GAP SERPL CALCULATED.3IONS-SCNC: 9 MMOL/L (ref 7–16)
AST SERPL-CCNC: 19 U/L (ref 0–31)
BILIRUB SERPL-MCNC: 0.3 MG/DL (ref 0–1.2)
BUN BLDV-MCNC: 11 MG/DL (ref 6–20)
CALCIUM SERPL-MCNC: 9 MG/DL (ref 8.6–10.2)
CHLORIDE BLD-SCNC: 100 MMOL/L (ref 98–107)
CO2: 27 MMOL/L (ref 22–29)
CREAT SERPL-MCNC: 0.6 MG/DL (ref 0.5–1)
GFR SERPL CREATININE-BSD FRML MDRD: >60 ML/MIN/1.73
GLUCOSE BLD-MCNC: 100 MG/DL (ref 74–99)
HBA1C MFR BLD: 5.4 % (ref 4–5.6)
POTASSIUM SERPL-SCNC: 4 MMOL/L (ref 3.5–5)
SODIUM BLD-SCNC: 136 MMOL/L (ref 132–146)
TOTAL PROTEIN: 7.6 G/DL (ref 6.4–8.3)

## 2023-01-11 PROCEDURE — 80053 COMPREHEN METABOLIC PANEL: CPT

## 2023-01-11 PROCEDURE — 83036 HEMOGLOBIN GLYCOSYLATED A1C: CPT

## 2023-01-14 DIAGNOSIS — F41.8 ANXIETY WITH DEPRESSION: ICD-10-CM

## 2023-01-16 DIAGNOSIS — F41.9 ANXIETY: ICD-10-CM

## 2023-01-16 RX ORDER — VENLAFAXINE HYDROCHLORIDE 75 MG/1
75 CAPSULE, EXTENDED RELEASE ORAL DAILY
Qty: 30 CAPSULE | Refills: 5 | Status: SHIPPED
Start: 2023-01-16 | End: 2023-01-18 | Stop reason: DRUGHIGH

## 2023-01-16 RX ORDER — AMITRIPTYLINE HYDROCHLORIDE 75 MG/1
TABLET, FILM COATED ORAL
Qty: 90 TABLET | Refills: 1 | Status: SHIPPED
Start: 2023-01-16 | End: 2023-01-18

## 2023-01-18 ENCOUNTER — OFFICE VISIT (OUTPATIENT)
Dept: FAMILY MEDICINE CLINIC | Age: 38
End: 2023-01-18
Payer: COMMERCIAL

## 2023-01-18 VITALS
HEART RATE: 88 BPM | RESPIRATION RATE: 16 BRPM | OXYGEN SATURATION: 98 % | BODY MASS INDEX: 30.7 KG/M2 | SYSTOLIC BLOOD PRESSURE: 135 MMHG | TEMPERATURE: 97.7 F | WEIGHT: 191 LBS | DIASTOLIC BLOOD PRESSURE: 78 MMHG | HEIGHT: 66 IN

## 2023-01-18 DIAGNOSIS — F41.9 ANXIETY: ICD-10-CM

## 2023-01-18 DIAGNOSIS — M51.24 THORACIC DISC HERNIATION: ICD-10-CM

## 2023-01-18 DIAGNOSIS — E88.81 INSULIN RESISTANCE: ICD-10-CM

## 2023-01-18 DIAGNOSIS — Z15.89 MTHFR MUTATION: ICD-10-CM

## 2023-01-18 DIAGNOSIS — G56.11 RIGHT MEDIAN NERVE NEUROPATHY: ICD-10-CM

## 2023-01-18 DIAGNOSIS — F41.8 ANXIETY WITH DEPRESSION: Primary | ICD-10-CM

## 2023-01-18 PROCEDURE — G8484 FLU IMMUNIZE NO ADMIN: HCPCS | Performed by: FAMILY MEDICINE

## 2023-01-18 PROCEDURE — 99214 OFFICE O/P EST MOD 30 MIN: CPT | Performed by: FAMILY MEDICINE

## 2023-01-18 PROCEDURE — 1036F TOBACCO NON-USER: CPT | Performed by: FAMILY MEDICINE

## 2023-01-18 PROCEDURE — G8417 CALC BMI ABV UP PARAM F/U: HCPCS | Performed by: FAMILY MEDICINE

## 2023-01-18 PROCEDURE — G8427 DOCREV CUR MEDS BY ELIG CLIN: HCPCS | Performed by: FAMILY MEDICINE

## 2023-01-18 RX ORDER — AMITRIPTYLINE HYDROCHLORIDE 100 MG/1
100 TABLET, FILM COATED ORAL NIGHTLY
Qty: 90 TABLET | Refills: 1 | Status: SHIPPED | OUTPATIENT
Start: 2023-01-18

## 2023-01-18 ASSESSMENT — PATIENT HEALTH QUESTIONNAIRE - PHQ9
4. FEELING TIRED OR HAVING LITTLE ENERGY: 0
SUM OF ALL RESPONSES TO PHQ QUESTIONS 1-9: 1
5. POOR APPETITE OR OVEREATING: 0
SUM OF ALL RESPONSES TO PHQ QUESTIONS 1-9: 1
SUM OF ALL RESPONSES TO PHQ QUESTIONS 1-9: 1
2. FEELING DOWN, DEPRESSED OR HOPELESS: 0
1. LITTLE INTEREST OR PLEASURE IN DOING THINGS: 0
6. FEELING BAD ABOUT YOURSELF - OR THAT YOU ARE A FAILURE OR HAVE LET YOURSELF OR YOUR FAMILY DOWN: 0
7. TROUBLE CONCENTRATING ON THINGS, SUCH AS READING THE NEWSPAPER OR WATCHING TELEVISION: 0
9. THOUGHTS THAT YOU WOULD BE BETTER OFF DEAD, OR OF HURTING YOURSELF: 0
SUM OF ALL RESPONSES TO PHQ QUESTIONS 1-9: 1
3. TROUBLE FALLING OR STAYING ASLEEP: 1
10. IF YOU CHECKED OFF ANY PROBLEMS, HOW DIFFICULT HAVE THESE PROBLEMS MADE IT FOR YOU TO DO YOUR WORK, TAKE CARE OF THINGS AT HOME, OR GET ALONG WITH OTHER PEOPLE: 0
SUM OF ALL RESPONSES TO PHQ9 QUESTIONS 1 & 2: 0
8. MOVING OR SPEAKING SO SLOWLY THAT OTHER PEOPLE COULD HAVE NOTICED. OR THE OPPOSITE, BEING SO FIGETY OR RESTLESS THAT YOU HAVE BEEN MOVING AROUND A LOT MORE THAN USUAL: 0

## 2023-01-18 ASSESSMENT — ENCOUNTER SYMPTOMS
ABDOMINAL PAIN: 0
TROUBLE SWALLOWING: 0
BACK PAIN: 1

## 2023-01-18 NOTE — PROGRESS NOTES
Stacia Leung is a 40y.o. year old female Established patient, here for evaluation of the following chief complaint(s):    Chief Complaint   Patient presents with    Wrist Pain     Right worse, wrist falling asleep, no strength    Back Pain    Health Maintenance     Declines flu vaccine       Joshua Nuñez was seen today for wrist pain, back pain and health maintenance. Diagnoses and all orders for this visit:    Anxiety with depression    Insulin resistance    Thoracic disc herniation    MTHFR mutation    Anxiety  -     amitriptyline (ELAVIL) 100 MG tablet; Take 1 tablet by mouth nightly    Right median nerve neuropathy  -     amitriptyline (ELAVIL) 100 MG tablet; Take 1 tablet by mouth nightly  -     EMG; Future  -     Hesham Woodward MD, Orthopaedics (hand & upper extremities), [place]      Plan  Mood issues are stable; sleep is worse- possibly due to CTS on R UE. Will increase Elavil and monitor SSx. Additional counseling options given     Send for EMG and referral placed to ortho, as she would consider surgery. Continue bracing. Elavil may help as well. Injection can be considered as well if not able to get into ortho quickly. Insulin resistance issue remains controlled. She is doing well even off of metformin. Congratulated her on her efforts. MTHFR stable on ASA    Thoracic back pain unchanged. Patient/guardian was given the opportunity to ask questions regarding the visit today. Questions were addressed. They verbalized comfort and understanding of the assessment and plan. Return in about 6 months (around 7/18/2023), or if symptoms worsen or fail to improve, for CTS, anxiety, back pain . SUBJECTIVE/OBJECTIVE:    HPI    R wrist pain  R hand dominant  Present x 1 year, worsening over past 3-4 months  Using brace with sleep without meaningful improvement.    Losing R hand strength and feels more incoordinated     Insulin resistance  Follow up  At last visit we did wean off metformin. Here today for return visit  Doing well. A1c has stabilized off metformin   Feels well  Weight went back up- admits to not exercising as much      Thoracic disc herniation  Follow up  Chronic issue, present for years. Chronic pain, seen by specialists in the past.    Pain is actually somewhat better with her notable weight loss  Working with chiropractor and massotherapy weekly or every other week   Does not want further therapy. Anxiety & insomnia  Follow up  States hand issues have been disrupting sleep. Ongoing stressors but feels they are stable. Feels mood is \"status quo\"   Did not pursue other counseling. Has been doing well on current dose of medications. Feels doing reasonably well overall. No concerns today. She is still functional and able to get done what she needs to get done. Review of Systems   HENT:  Negative for trouble swallowing. Eyes:  Negative for visual disturbance. Cardiovascular:  Negative for chest pain and palpitations. Gastrointestinal:  Negative for abdominal pain. Musculoskeletal:  Positive for arthralgias and back pain. Neurological:  Negative for dizziness, syncope and weakness. Objective   /78   Pulse 88   Temp 97.7 °F (36.5 °C) (Temporal)   Resp 16   Ht 5' 6\" (1.676 m)   Wt 191 lb (86.6 kg)   LMP 01/17/2023 (Exact Date)   SpO2 98%   BMI 30.83 kg/m²     Physical Exam  Vitals reviewed. Constitutional:       General: She is not in acute distress. Appearance: Normal appearance. Cardiovascular:      Rate and Rhythm: Normal rate and regular rhythm. Pulses: Normal pulses. Heart sounds: No murmur heard. Pulmonary:      Breath sounds: Normal breath sounds. No wheezing, rhonchi or rales. Abdominal:      General: Bowel sounds are normal. There is no distension. Palpations: Abdomen is soft. Tenderness: There is no abdominal tenderness.    Musculoskeletal:      Thoracic back: Tenderness (midline) present. No deformity. Normal range of motion. Lumbar back: Normal.      Left ankle: No swelling or deformity. Normal range of motion. Normal pulse. Comments: RUE with thenar wasting.  +Tinel and [place] bilaterally, R>L   Neurological:      Mental Status: She is alert. Psychiatric:         Attention and Perception: Attention normal.         Mood and Affect: Mood and affect normal.         Thought Content: Thought content normal.         Cognition and Memory: Cognition and memory normal.         Judgment: Judgment normal.            An electronic signature was used to authenticate this note.     --Frank Hilton MD

## 2023-02-02 ENCOUNTER — HOSPITAL ENCOUNTER (OUTPATIENT)
Dept: NEUROLOGY | Age: 38
Discharge: HOME | End: 2023-02-02
Payer: COMMERCIAL

## 2023-02-02 VITALS — BODY MASS INDEX: 30.53 KG/M2 | HEIGHT: 66 IN | WEIGHT: 190 LBS

## 2023-02-02 DIAGNOSIS — G56.11 RIGHT MEDIAN NERVE NEUROPATHY: ICD-10-CM

## 2023-02-02 PROCEDURE — 95886 MUSC TEST DONE W/N TEST COMP: CPT | Performed by: PSYCHIATRY & NEUROLOGY

## 2023-02-02 PROCEDURE — 95886 MUSC TEST DONE W/N TEST COMP: CPT

## 2023-02-02 PROCEDURE — 95913 NRV CNDJ TEST 13/> STUDIES: CPT | Performed by: PSYCHIATRY & NEUROLOGY

## 2023-02-02 PROCEDURE — 95913 NRV CNDJ TEST 13/> STUDIES: CPT

## 2023-02-02 NOTE — PROCEDURES
Jonny  22.  Electrodiagnostic Laboratory  [place]        Full Name: Barbara Ramírez Gender: Female  MRN: [id number] YOB: 1985  Location: Outpt. Visit Date: 2/2/2023 08:12  Age: 40 Years 6 Months Old  Examining Physician: Dr. Lauri Garvey  Referring Physician: Dr. Glendia Rubinstein  Technician: Tosha Burton   Height: 5 feet 6 inch  Weight: 190 lbs; BMI 30.7  Notes: Rt. median nerve  neuropathy G56.11        Motor NCS      Nerve / Sites Lat. Amplitude Amp. 1-2 Distance Lat Diff Velocity Temp.    ms mV % cm ms m/s °C   R Median - APB      Wrist 3.59 13.1 100 8   32.7      Elbow 6.93 12.7 97.3 19 3.33 57 32   L Median - APB      Wrist 3.39 11.6 100 8   32.5      Elbow 6.67 11.6 99.8 19 3.28 58 32.5   R Ulnar - ADM      Wrist 2.14 11.4 100 8   32.7      B. Elbow 5.00 11.3 98.6 18 2.86 63 32.7      A. Elbow 6.35 10.9 95.3 10 1.35 74 32.7       Sensory NCS      Nerve / Sites Onset Lat Peak Lat PP Amp Distance Velocity Temp.    ms ms µV cm m/s °C   L Median - Digit II (Antidromic)      Mid Palm 1.20 1.72 66.6 7 58 32.9      Wrist 2.86 3.65 66.3 14 49 32.9   R Median - Digit II (Antidromic)      Mid Palm 1.15 1.67 62.0 7 61 32.7      Wrist 3.13 3.80 59.4 14 45 32.7   R Ulnar - Digit V (Antidromic)      Wrist 2.40 3.02 63.5 14 58 32.7   R Radial - Anatomical snuff box (Forearm)      Forearm 1.51 2.08 34.1 10 66 32.9       Combined Sensory Index      Nerve / Sites Rec. Site Peak Lat NP Amp PP Amp Segments Peak Diff Temp.      ms µV µV  ms °C   R Median - CSI      Median Thumb 3.54 26.5 48.8 Median - Radial 1.35 32.6      Radial Thumb 2.19 13.7 20.4 Median - Ulnar 0.89 32.6      Median Ring 4.06 20.0 40.2 Median palm - Ulnar palm 0.73 32.6      Ulnar Ring 3.18 16.6 30.2         Median palm Wrist 2.45 63.0 76.3         Ulnar palm Wrist 1.72 16.4 23.4         CSI     CSI 2.97    L Median - CSI      Median Thumb 3.13 41.1 76.3 Median - Radial 0.57 32.7      Radial Thumb 2.55 12.1 21.4 Median - Ulnar 0.78 32.7      Median Ring 3.80 24.6 46.1 Median palm - Ulnar palm 0.42 32.7      Ulnar Ring 3.02 22.9 22.4         Median palm Wrist 2.08 79.8 82.9         Ulnar palm Wrist 1.67 15.1 18.3         CSI     CSI 1.77            F  Wave      Nerve F Lat M Lat F-M Lat    ms ms ms   R Median - APB 27.8 3.6 24.1   R Ulnar - ADM 26.1 2.0 24.1   L Median - APB 25.7 3.4 22.2         EMG         EMG Summary Table     Spontaneous MUAP Recruitment   Muscle IA Fib PSW Fasc H.F. Amp Dur. PPP Pattern   R. Cervical paraspinals (mid) N None None None None N N N N   R. Cervical paraspinals (low) N None None None None N N N N   R. Deltoid N None None None None N N N N   R. Triceps brachii N None None None None N N N N   R. Biceps brachii N None None None None N N N N   R. Brachioradialis N None None None None N N N N   R. Extensor indicis proprius N None None None None N N N N   R. Flexor digitorum profundus (Ulnar) N None None None None N N N N   R. Flexor pollicis longus N None None None None N N N N   R. First dorsal interosseous N None None None None N N N N   R. Abductor pollicis brevis N None None None None N N N Sl Decr        Nerve conduction studies in both arms displayed increased CSI values in both median nerves, more so on the right. The F-wave latency of the right median nerve was also delayed. These findings were compared to the referential values in this laboratory, available upon request.    Monopolar needle examination of the right arm minimal conduction block and adductor pollicis brevis muscle. Needle testing of the paraspinals proved unrevealing. Electrodiagnostic examination of both arms disclosed evidence diagnostic of bilateral median neuropathies at/or distal to the wrist, of minimal severity, although the right was more affected. These findings were consistent with carpal tunnel syndrome. There were no other peripheral neuropathies.   There were no motor radiculopathies or intracanalicular lesions. Sensory radiculopathies cannot be evaluated electrodiagnostic means. Clinically, the patient presented with tingling sensations in both hands, more so on the right, especially at night. She worked over 60 hours weekly and regularly lifted weights. Her difficulties are related to her carpal tunnel syndrome. Clinical correlation was highly advised.

## 2023-02-08 ENCOUNTER — TELEPHONE (OUTPATIENT)
Dept: OCCUPATIONAL THERAPY | Age: 38
End: 2023-02-08

## 2023-02-14 ENCOUNTER — TELEPHONE (OUTPATIENT)
Dept: PHYSICAL THERAPY | Age: 38
End: 2023-02-14

## 2023-02-14 NOTE — TELEPHONE ENCOUNTER
Sending to [facility] [employer] to home per [facility]

## 2023-02-28 ENCOUNTER — EVALUATION (OUTPATIENT)
Dept: OCCUPATIONAL THERAPY | Age: 38
End: 2023-02-28
Payer: COMMERCIAL

## 2023-02-28 DIAGNOSIS — G56.01 CARPAL TUNNEL SYNDROME ON RIGHT: Primary | ICD-10-CM

## 2023-02-28 PROCEDURE — 97165 OT EVAL LOW COMPLEX 30 MIN: CPT | Performed by: OCCUPATIONAL THERAPIST

## 2023-02-28 PROCEDURE — 97140 MANUAL THERAPY 1/> REGIONS: CPT | Performed by: OCCUPATIONAL THERAPIST

## 2023-02-28 NOTE — PROGRESS NOTES
OCCUPATIONAL THERAPY INITIAL EVALUATION    [address]  [facility] OCCUPATIONAL THERAPY  [address] [place] [phone]  Dept: [phone]  Loc: [facility][note 3] [address] [facility] Fax: [phone]    Date:  2023  Initial Evaluation Date: 23   Evaluating Therapist: Deanna Brown OT    Patient Name:  Dina García    :  1985    Restrictions/Precautions:  none, low fall risk  Diagnosis:  carpal tunnel right G56.01       Date of Surgery/Injury: began for the last 6 months    Insurance/Certification information:  Medical [place]  Plan of care signed (Y/N): electronic signed    Visit# / total visits:   Referring Practitioner:  Romeo Sorto MD  Specific Practitioner Orders: eval and treat. Assessment of current deficits   [x] Functional mobility  [x] ADLs  [x] Strength   [] Cognition   [] Functional transfers   [x] IADLs  [x] Safety Awareness  [x] Endurance   [] Fine Motor Coordination  [] Balance  [] Vision/perception  [] Sensation    [] Gross Motor Coordination [x] ROM  [x] Pain   [] Edema    [] Scar Adhesion/Skin Integrity     OT PLAN OF CARE   OT POC based on physician orders, patient diagnosis and results of clinical assessment  Frequency/Duration  2x week for 6 visits.  Certification period: from 23 to 23  Reassessment date:     Specific OT Treatment to include:     [x] Instruction in HEP                   Modalities:  [x] Therapeutic Exercise        [x] Ultrasound               [] Electrical Stimulation/Attended  [x] PROM/Stretching                    [x] Fluidotherapy          [x]  Paraffin                   [] AAROM  [x] AROM                 [] Iontophoresis:   [x] Tendon Glides                                               [x] Neuromuscular Re-Ed            [] ADL/IADL re-training    [x] Therapeutic Activity       [x] Pain Management with/without modalities PRN                 [x] Manual Therapy                      [x] Splinting                      [] Scar Management                   []Joint Protection/Training  []Ergonomics                             [] Joint Mobilization        [] Adaptive Equipment Assessment/Training                             [] Manual Edema Mobilization   [x] Myofascial Release                 [] Energy Conservation/Work Simplification  [] GM/FM Coordination       [x] Safety retraining/education per  individual diagnosis/goals  [] Desensitization       Patient Specific Goal: elimination of pain and tingling in the right hand                             GOALS (Long term same as Short term):  1) Patient will demonstrate good understanding of home program (exercises/activities/diagnosis/prognosis/goals) with good accuracy. 2) Patient will demonstrate increased /pinch strength of at least 10 / 5 pinch pounds of their affected hand/UE. 3) Patient to report decreased pain in their affected hand/ upper extremity from 0-5/10 to 0-2/10 or less with functional use. 4) Therapist will provide splinting as needed  and patient to report 100% compliance with their splint wear, care, and precautions if needed. 5) Patient will report ADL functions as I using affected hand/UE   6)Pt. Will report decreased numbness episodes in the right hand throughout the day and night by 50%. 7) Patient will decrease QuickDASH score to 15% or less for increased participation in daily functional activities.      Past Medical History:   Past Medical History:   Diagnosis Date    Blood type, Rh negative     Gestational diabetes     pt takes lantus 28 units nightly    H/O bleeding disorder     Keratosis palmoplantaris with corneal dystrophy ([facility] [address]     Dr Jeannette Gregg in [place]     Kidney stones     MTHFR mutation     homozygous     Past Surgical History:   Past Surgical History:   Procedure Laterality Date    DILATION AND CURETTAGE  2012    FACIAL COSMETIC SURGERY      mole removal    KIDNEY STONE SURGERY  2016    lithotripsy       Reason for Referral: pt. States it started approx 6 months ago. She works 3 jobs and is very active. Her hands go numb several times a day. She also works out with heavy weights which also increases numbness. Home Living: lives with her   Prior Level of Function: independent    Cognition:   Alert/Oriented x3     IADL STATUS:   Ind Mod I Min A Mod A Max A Dep Other   Homemaking Responsibility  x        Shopping Responsibility:  x        Mode of Transportation:  x        Leisure & Hobbies:  x        Work:  x          Comments:pt. Works as a dental assistant,  and catering job    ADL STATUS:   Ind Mod I Min A Mod A Max A Dep Other   Feeding:  x        Grooming:  x        Bathing:  x        UE Dressing:  x        LE Dressing:  x        Toileting:  x        Transfers: x           Comments:    Pain Level: 0-4 on scale of 1-10, aching    UE Assessment:  AROM is within full range throughout. Comment: Hand Dominance is right    Sensation: right  Able To Sense (Y) / Unable to Sense (N)  SEMMES-HARRISON Thumb 2nd Digit 3rd Digit  4th Digit  5th Digit    Normal Touch  Size: 2.83        Diminished Light Touch   Size: 3.61 Y Y Y Y Y   Diminished Protective Sense  Size: 4.31        Loss of Protective Sense   Size: 4.56        Loss of Sensation  Size: 6.65            Edema Description/Circumferential Measurements:   No visible edema is noted. Dynamometer (setting 2) 2/28/23     Left 62#     Right 40#     Pinch (lateral)      Left 17#     Right 17#     Pinch (tripod)      Left 18#     Right 12#        9 Hole Peg test      Left wnl     Right wnl        QuickDash       50%       Intervention: beginning hep active rom exercises provided with good understanding.        Eval Complexity: low  Profile and History- low  Assessment of Occupational Performance and Identification of Deficits- low  Clinical Decision Making- low    Rehab Potential: [x] Good  [] Fair  [] Poor        Suggested Professional Referral:       [x] No  [] Yes:  Barriers to Goal Achievement[de-identified]          [x] No  [] Yes:  Domestic Concerns:                           [x] No  [] Yes:       Patient. Education:  [x] Plans/Goals, Risks/Benefits discussed  [x] Home exercise program  Method of Education: [x] Verbal  [] Demo  [] Written  Comprehension of Education:  [x] Verbalizes understanding. [x] Demonstrates understanding. [] Needs Review. [] Demonstrates/verbalizes understanding of HEP/Ed previously given. Patient understands diagnosis/prognosis and consents to treatment, plan and goals: [x] Yes    [] No     Goal Formulation: Patient  Time In: 7            Time Out: 8                      Timed Code Treatment Minutes: 60 minutes      CODE  Minutes  Units   68293 OT Eval Low 30 1   46780 OT Eval Medium     98761 OT Eval High     83954 Fluidotherapy     82555 Manual 15 1   91522 Therapeutic Ex     53319 Therapeutic Activity     48273 ADL/COMP Tech Train     96842 Neuromuscular Re-Ed     69628 OrthoManagementTraining     25551 Paraffin     82057 Electrical Stim - Attended     [id number] Iontophoresis     87588 Ultrasound      Other       45 2        Electronically signed by: [facility] OT/L, CHT [id number]     [id number] Certification / Comments      Frequency/Duration 2/ week for 18 visits. Certification period From: present To: 5/28/23     I have reviewed the Plan of Care established for skilled therapy services and certify that the services are required and that they will be provided while the patient is under my care.      Physician's Comments/Revisions:                   Physicians's Printed Name:  Dr. Bobbi Fleming                                   Physician's Signature:                                                               Date:      Please review Patient's OT evaluation and if you agree Cosign note or sign/date and fax back to us at our [facility] [address] [facility] OT Fax: [phone].  Thank you for your referral!

## 2023-03-07 ENCOUNTER — TREATMENT (OUTPATIENT)
Dept: OCCUPATIONAL THERAPY | Age: 38
End: 2023-03-07
Payer: COMMERCIAL

## 2023-03-07 ENCOUNTER — TELEPHONE (OUTPATIENT)
Dept: FAMILY MEDICINE CLINIC | Age: 38
End: 2023-03-07

## 2023-03-07 DIAGNOSIS — G56.01 CARPAL TUNNEL SYNDROME ON RIGHT: Primary | ICD-10-CM

## 2023-03-07 PROCEDURE — 97140 MANUAL THERAPY 1/> REGIONS: CPT | Performed by: OCCUPATIONAL THERAPIST

## 2023-03-07 PROCEDURE — 97022 WHIRLPOOL THERAPY: CPT | Performed by: OCCUPATIONAL THERAPIST

## 2023-03-07 PROCEDURE — 97110 THERAPEUTIC EXERCISES: CPT | Performed by: OCCUPATIONAL THERAPIST

## 2023-03-07 NOTE — TELEPHONE ENCOUNTER
Referral to Dr. Bo Black was declined because doctor is leaving [facility]. Patient would like referral faxed to Dr. Vandana Salinas at [facility].   Will fax EMG and referral.

## 2023-03-07 NOTE — PROGRESS NOTES
[address] [place] [address]  [phone]    Date:  3/7/2023     Initial Evaluation Date: 23                                Evaluating Therapist: Hammad Padilla OT     Patient Name:  Cecilia Thakkar                     :  1985     Restrictions/Precautions:  none, low fall risk  Diagnosis:  carpal tunnel right G56.01                                                           Date of Surgery/Injury: began for the last 6 months     Insurance/Certification information:  Medical [place]  Plan of care signed (Y/N): electronic signed     Visit# / total visits:   Referring Practitioner:  Kelli Key MD  Specific Practitioner Orders: eval and treat. Assessment of current deficits   [x] Functional mobility             [x] ADLs           [x] Strength                  [] Cognition   [] Functional transfers           [x] IADLs          [x] Safety Awareness  [x] Endurance   [] Fine Motor Coordination    [] Balance      [] Vision/perception    [] Sensation     [] Gross Motor Coordination [x] ROM           [x] Pain                        [] Edema          [] Scar Adhesion/Skin Integrity      OT PLAN OF CARE   OT POC based on physician orders, patient diagnosis and results of clinical assessment  Frequency/Duration  2x week for 6 visits. Certification period: from 23 to 23  Reassessment date:                                 GOALS (Long term same as Short term):  1) Patient will demonstrate good understanding of home program (exercises/activities/diagnosis/prognosis/goals) with good accuracy. 2) Patient will demonstrate increased /pinch strength of at least 10 / 5 pinch pounds of their affected hand/UE. 3) Patient to report decreased pain in their affected hand/ upper extremity from 0-5/10 to 0-2/10 or less with functional use.    4) Therapist will provide splinting as needed  and patient to report 100% compliance with their splint wear, care, and precautions if needed. 5) Patient will report ADL functions as I using affected hand/UE   6)Pt. Will report decreased numbness episodes in the right hand throughout the day and night by 50%. 7) Patient will decrease QuickDASH score to 15% or less for increased participation in daily functional activities. TODAY'S DAILY NOTE:    Pain Level: 5 on scale of 1-10, aching and with paresthesia    Subjective: \"felt good after therapy, but now back with numbness in the right hand. .\"     Objective:  Updated POC to be completed by 10th visit. INTERVENTION: COMPLETED: SPECIFICS/COMMENTS:   Modality:     fluido x 10 min to increase tissue mobility        AROM:     Nerve glides med x 10 x   Tendon glides x 10x   AAROM:               PROM/Stretching:     Wrist stretches/carpal tunnel stretch x 10x        Scar Mass/Edema Control:     Soft tissue mobilization with red light therapy x Carpal tunnel area. To decrease inflammation, reduce pain and increase tissue mobility. 20 min        Strengthening:               Other:     hep x Wear wrist cock up when able,  tendon glides, nerve glides. Assessment/Comments: Pt is making Fair progress toward stated plan of care. No sign change since last seen. Reviewed home tendon glides and nerve glides. To cont splint wear 24/7 or when able. -Rehab Potential: Good    Billing:    TIME IN:          TIME OUT:           TOTAL TREATMENT TIME:          CODE   TODAY MINUTES TODAY UNITS     01845 Fluidotherapy 10  1      24319 Manual  30 2     18756 Therapeutic Ex 15 1     83651 Therapeutic Activity       94728 ADL/COMP Tech Train         18209 Neuromuscular Re-Ed       89526 OrthoManagementTraining           Modality:           Other                                             TOTAL   55 4                    Goals: Goals for pt can be seen on initial evaluation. Plan:   [x]  Continues Plan of care: Treatment covered based on POC and graduated to patient's progress.  Pt education continues at each visit to obtain maximum benefits from skilled OT intervention.   []  Alter Plan of care:   []  Discharge:      Colton Gonzalez OT/L, [address]

## 2023-03-09 ENCOUNTER — TREATMENT (OUTPATIENT)
Dept: OCCUPATIONAL THERAPY | Age: 38
End: 2023-03-09

## 2023-03-09 DIAGNOSIS — G56.01 CARPAL TUNNEL SYNDROME ON RIGHT: Primary | ICD-10-CM

## 2023-03-09 NOTE — PROGRESS NOTES
[address] [place] [address]  [phone]    Date:  3/9/2023     Initial Evaluation Date: 23                                Evaluating Therapist: Hammad Padilla OT     Patient Name:  Cecilia Thakkar                     :  1985     Restrictions/Precautions:  none, low fall risk  Diagnosis:  carpal tunnel right G56.01                                                           Date of Surgery/Injury: began for the last 6 months     Insurance/Certification information:  Medical [place]  Plan of care signed (Y/N): electronic signed     Visit# / total visits: 3/ 12  Referring Practitioner:  Kelli Key MD  Specific Practitioner Orders: eval and treat. Assessment of current deficits   [x] Functional mobility             [x] ADLs           [x] Strength                  [] Cognition   [] Functional transfers           [x] IADLs          [x] Safety Awareness  [x] Endurance   [] Fine Motor Coordination    [] Balance      [] Vision/perception    [] Sensation     [] Gross Motor Coordination [x] ROM           [x] Pain                        [] Edema          [] Scar Adhesion/Skin Integrity      OT PLAN OF CARE   OT POC based on physician orders, patient diagnosis and results of clinical assessment  Frequency/Duration  2x week for 6 visits. Certification period: from 23 to 23  Reassessment date:                                 GOALS (Long term same as Short term):  1) Patient will demonstrate good understanding of home program (exercises/activities/diagnosis/prognosis/goals) with good accuracy. 2) Patient will demonstrate increased /pinch strength of at least 10 / 5 pinch pounds of their affected hand/UE. 3) Patient to report decreased pain in their affected hand/ upper extremity from 0-5/10 to 0-2/10 or less with functional use.    4) Therapist will provide splinting as needed  and patient to report 100% compliance with their splint wear, care, and precautions if needed. 5) Patient will report ADL functions as I using affected hand/UE   6)Pt. Will report decreased numbness episodes in the right hand throughout the day and night by 50%. 7) Patient will decrease QuickDASH score to 15% or less for increased participation in daily functional activities. TODAY'S DAILY NOTE:    Pain Level: 5 on scale of 1-10, aching and with paresthesia    Subjective: \"my arm has been achy since last seen. I'm doing the exercises but it's not helping. \"     Objective:  Updated POC to be completed by 10th visit. INTERVENTION: COMPLETED: SPECIFICS/COMMENTS:   Modality:     fluido x 10 min to increase tissue mobility        AROM:     Nerve glides med x 10 x   Tendon glides x 10x   AAROM:               PROM/Stretching:     Wrist stretches/carpal tunnel stretch x 10x        Scar Mass/Edema Control:     Soft tissue mobilization with red light therapy x Carpal tunnel area. To decrease inflammation, reduce pain and increase tissue mobility. 20 min        Strengthening:               Other:     hep x Wear wrist cock up when able,  tendon glides, nerve glides. Assessment/Comments: Pt is making Fair progress toward stated plan of care. Aching cont's to date. Good roldan with exercises. No change. .     -Rehab Potential: Good    Billing:    TIME IN:          TIME OUT:           TOTAL TREATMENT TIME:          CODE   TODAY MINUTES TODAY UNITS     69618 Fluidotherapy 10  1      52949 Manual  30 2     37483 Therapeutic Ex 15 1     58577 Therapeutic Activity       18090 ADL/COMP Tech Train         85740 Neuromuscular Re-Ed       71142 OrthoManagementTraining           Modality:           Other                                             TOTAL   55 4                    Goals: Goals for pt can be seen on initial evaluation. Plan:   [x]  Continues Plan of care: Treatment covered based on POC and graduated to patient's progress.  Pt education continues at each visit to obtain maximum benefits from skilled OT intervention.   []  Alter Plan of care:   []  Discharge:      Hammad Padilla OT/L, [address]

## 2023-03-14 ENCOUNTER — TREATMENT (OUTPATIENT)
Dept: OCCUPATIONAL THERAPY | Age: 38
End: 2023-03-14
Payer: COMMERCIAL

## 2023-03-14 DIAGNOSIS — G56.01 CARPAL TUNNEL SYNDROME ON RIGHT: Primary | ICD-10-CM

## 2023-03-14 PROCEDURE — 97110 THERAPEUTIC EXERCISES: CPT | Performed by: OCCUPATIONAL THERAPIST

## 2023-03-14 PROCEDURE — 97022 WHIRLPOOL THERAPY: CPT | Performed by: OCCUPATIONAL THERAPIST

## 2023-03-14 PROCEDURE — 97140 MANUAL THERAPY 1/> REGIONS: CPT | Performed by: OCCUPATIONAL THERAPIST

## 2023-03-14 NOTE — PROGRESS NOTES
OCCUPATIONAL THERAPY DAILY NOTE  [facility]  [phone]    Date:  3/14/2023     Initial Evaluation Date: 23                                Evaluating Therapist: Ry Bonner OT     Patient Name:  Lyn Gerardo                     :  1985     Restrictions/Precautions:  none, low fall risk  Diagnosis:  carpal tunnel right G56.01                                                           Date of Surgery/Injury: began for the last 6 months     Insurance/Certification information:  Medical [place]  Plan of care signed (Y/N): electronic signed     Visit# / total visits:   Referring Practitioner:  Nir Sarabia MD  Specific Practitioner Orders: eval and treat.      Assessment of current deficits   [x] Functional mobility             [x] ADLs           [x] Strength                  [] Cognition   [] Functional transfers           [x] IADLs          [x] Safety Awareness  [x] Endurance   [] Fine Motor Coordination    [] Balance      [] Vision/perception    [] Sensation     [] Gross Motor Coordination [x] ROM           [x] Pain                        [] Edema          [] Scar Adhesion/Skin Integrity      OT PLAN OF CARE   OT POC based on physician orders, patient diagnosis and results of clinical assessment  Frequency/Duration  2x week for 6 visits. Certification period: from 23 to 23  Reassessment date:                                 GOALS (Long term same as Short term):  1) Patient will demonstrate good understanding of home program (exercises/activities/diagnosis/prognosis/goals) with good accuracy.   2) Patient will demonstrate increased /pinch strength of at least 10 / 5 pinch pounds of their affected hand/UE.   3) Patient to report decreased pain in their affected hand/ upper extremity from 0-5/10 to 0-2/10 or less with functional use.   4) Therapist will provide splinting as needed  and patient to report 100% compliance with their splint wear, care, and  precautions if needed. 5) Patient will report ADL functions as I using affected hand/UE   6)Pt. Will report decreased numbness episodes in the right hand throughout the day and night by 50%. 7) Patient will decrease QuickDASH score to 15% or less for increased participation in daily functional activities. TODAY'S DAILY NOTE:    Pain Level: 5 on scale of 1-10, aching and with paresthesia    Subjective: \"So far therapy is not helping. \"     Objective:  Updated POC to be completed by 10th visit. INTERVENTION: COMPLETED: SPECIFICS/COMMENTS:   Modality:     fluido x 10 min to increase tissue mobility        AROM:     Nerve glides med x 10 x   Tendon glides x 10x   AAROM:               PROM/Stretching:     Wrist stretches/carpal tunnel stretch x 10x        Scar Mass/Edema Control:     Soft tissue mobilization with red light therapy x Carpal tunnel area. To decrease inflammation, reduce pain and increase tissue mobility. 20 min        Strengthening:               Other:     hep x Wear wrist cock up when able,  tendon glides, nerve glides. Assessment/Comments: Pt is making Fair progress toward stated plan of care. No change is noted with numbness and pain. Pt. Cont's hep. Reviewed exercises. -Rehab Potential: Good    Billing:    TIME IN:          TIME OUT:           TOTAL TREATMENT TIME:          CODE   TODAY MINUTES TODAY UNITS     07127 Fluidotherapy 10  1      06426 Manual  30 2     57293 Therapeutic Ex 15 1     41571 Therapeutic Activity       65237 ADL/COMP Tech Train         84975 Neuromuscular Re-Ed       76105 OrthoManagementTraining           Modality:           Other                                             TOTAL   55 4                    Goals: Goals for pt can be seen on initial evaluation. Plan:   [x]  Continues Plan of care: Treatment covered based on POC and graduated to patient's progress.  Pt education continues at each visit to obtain maximum benefits from skilled OT intervention.   []  Alter Plan of care:   []  Discharge:      Dnaiel Sigala OT/L, [address]

## 2023-03-16 ENCOUNTER — TREATMENT (OUTPATIENT)
Dept: OCCUPATIONAL THERAPY | Age: 38
End: 2023-03-16

## 2023-03-16 DIAGNOSIS — G56.01 CARPAL TUNNEL SYNDROME ON RIGHT: Primary | ICD-10-CM

## 2023-03-16 NOTE — PROGRESS NOTES
OCCUPATIONAL THERAPY DAILY NOTE/DISCHARGE  [facility]  [phone]    Date:  3/16/2023     Initial Evaluation Date: 23                                Evaluating Therapist: Ry Bonner OT     Patient Name:  Lyn Gerardo                     :  1985     Restrictions/Precautions:  none, low fall risk  Diagnosis:  carpal tunnel right G56.01                                                           Date of Surgery/Injury: began for the last 6 months     Insurance/Certification information:  Medical [place]  Plan of care signed (Y/N): electronic signed     Visit# / total visits:   Referring Practitioner:  Nir Sarabia MD  Specific Practitioner Orders: eval and treat.      Assessment of current deficits   [x] Functional mobility             [x] ADLs           [x] Strength                  [] Cognition   [] Functional transfers           [x] IADLs          [x] Safety Awareness  [x] Endurance   [] Fine Motor Coordination    [] Balance      [] Vision/perception    [] Sensation     [] Gross Motor Coordination [x] ROM           [x] Pain                        [] Edema          [] Scar Adhesion/Skin Integrity      OT PLAN OF CARE   OT POC based on physician orders, patient diagnosis and results of clinical assessment  Frequency/Duration  2x week for 6 visits. Certification period: from 23 to 23  Reassessment date:                                 GOALS (Long term same as Short term):  1) Patient will demonstrate good understanding of home program (exercises/activities/diagnosis/prognosis/goals) with good accuracy. GOAL MET  2) Patient will demonstrate increased /pinch strength of at least 10 / 5 pinch pounds of their affected hand/UE. GOAL NOT MET  3) Patient to report decreased pain in their affected hand/ upper extremity from 0-5/10 to 0-2/10 or less with functional use. GOAL NOT MET  4) Therapist will provide splinting as needed  and patient to report 100% compliance  with their splint wear, care, and precautions if needed. GOAL MET  5) Patient will report ADL functions as I using affected hand/UE  GOAL NOT MET  6)Pt. Will report decreased numbness episodes in the right hand throughout the day and night by 50%. GOAL NOT MET  7) Patient will decrease QuickDASH score to 15% or less for increased participation in daily functional activities. GOAL NOT MET         TODAY'S DAILY NOTE:    Pain Level: 5 on scale of 1-10, aching and with paresthesia    Subjective: \"today is my last day since therapy is not helping. \"     Objective:  Updated POC to be completed by 10th visit. INTERVENTION: COMPLETED: SPECIFICS/COMMENTS:   Modality:     fluido x 10 min to increase tissue mobility        AROM:     Nerve glides med x 10 x   Tendon glides x 10x   AAROM:               PROM/Stretching:     Wrist stretches/carpal tunnel stretch x 10x        Scar Mass/Edema Control:     Soft tissue mobilization with red light therapy x Carpal tunnel area. To decrease inflammation, reduce pain and increase tissue mobility. 20 min        Strengthening:               Other:     hep x Wear wrist cock up when able,  tendon glides, nerve glides. Assessment/Comments: Pt is making Fair progress toward stated plan of care. No change is noted with numbness and pain. Pt. Cont's hep. Reviewed exercises. DISCHARGE TO [facility]. PT. REPORTS NO CHANGE IN NUMBNESS AND PAIN SINCE BEGINNING OF THERAPY THEREFORE PT. HAS BEEN D/C TO [facility]. PT. TO FOLLOW UP WITH DOCTOR IN 2 WEEKS.    -Rehab Potential: Good    Billing:    TIME IN:          TIME OUT:           TOTAL TREATMENT TIME:          CODE   TODAY MINUTES TODAY UNITS     87754 Fluidotherapy 10  1      40213 Manual  30 2     69775 Therapeutic Ex 15 1     35762 Therapeutic Activity       02600 ADL/COMP Tech Train         06878 Neuromuscular Re-Ed       87923 OrthoManagementTraining           Modality:           Other                                             TOTAL   55 4 Goals: Goals for pt can be seen on initial evaluation. Plan:   []  Continues Plan of care: Treatment covered based on POC and graduated to patient's progress. Pt education continues at each visit to obtain maximum benefits from skilled OT intervention.   []  Alter Plan of care:   [x]  Discharge:      Parvez Castelan OT/L, [address]

## 2023-07-19 DIAGNOSIS — G56.11 RIGHT MEDIAN NERVE NEUROPATHY: ICD-10-CM

## 2023-07-19 DIAGNOSIS — F41.9 ANXIETY: ICD-10-CM

## 2023-07-19 RX ORDER — AMITRIPTYLINE HYDROCHLORIDE 100 MG/1
TABLET, FILM COATED ORAL
Qty: 90 TABLET | Refills: 1 | Status: SHIPPED | OUTPATIENT
Start: 2023-07-19

## 2023-08-01 DIAGNOSIS — F41.8 ANXIETY WITH DEPRESSION: ICD-10-CM

## 2023-08-02 RX ORDER — VENLAFAXINE HYDROCHLORIDE 75 MG/1
75 CAPSULE, EXTENDED RELEASE ORAL DAILY
Qty: 30 CAPSULE | Refills: 5 | OUTPATIENT
Start: 2023-08-02

## 2023-10-23 SDOH — ECONOMIC STABILITY: HOUSING INSECURITY
IN THE LAST 12 MONTHS, WAS THERE A TIME WHEN YOU DID NOT HAVE A STEADY PLACE TO SLEEP OR SLEPT IN A SHELTER (INCLUDING NOW)?: NO

## 2023-10-23 SDOH — ECONOMIC STABILITY: TRANSPORTATION INSECURITY
IN THE PAST 12 MONTHS, HAS LACK OF TRANSPORTATION KEPT YOU FROM MEETINGS, WORK, OR FROM GETTING THINGS NEEDED FOR DAILY LIVING?: NO

## 2023-10-23 SDOH — ECONOMIC STABILITY: FOOD INSECURITY: WITHIN THE PAST 12 MONTHS, THE FOOD YOU BOUGHT JUST DIDN'T LAST AND YOU DIDN'T HAVE MONEY TO GET MORE.: NEVER TRUE

## 2023-10-23 SDOH — ECONOMIC STABILITY: INCOME INSECURITY: HOW HARD IS IT FOR YOU TO PAY FOR THE VERY BASICS LIKE FOOD, HOUSING, MEDICAL CARE, AND HEATING?: NOT HARD AT ALL

## 2023-10-23 SDOH — ECONOMIC STABILITY: FOOD INSECURITY: WITHIN THE PAST 12 MONTHS, YOU WORRIED THAT YOUR FOOD WOULD RUN OUT BEFORE YOU GOT MONEY TO BUY MORE.: NEVER TRUE

## 2023-10-25 ENCOUNTER — OFFICE VISIT (OUTPATIENT)
Dept: FAMILY MEDICINE CLINIC | Age: 38
End: 2023-10-25
Payer: COMMERCIAL

## 2023-10-25 VITALS
WEIGHT: 208 LBS | OXYGEN SATURATION: 98 % | HEART RATE: 107 BPM | SYSTOLIC BLOOD PRESSURE: 121 MMHG | TEMPERATURE: 97.8 F | DIASTOLIC BLOOD PRESSURE: 89 MMHG | BODY MASS INDEX: 33.43 KG/M2 | HEIGHT: 66 IN | RESPIRATION RATE: 16 BRPM

## 2023-10-25 DIAGNOSIS — F41.8 ANXIETY WITH DEPRESSION: ICD-10-CM

## 2023-10-25 DIAGNOSIS — M51.24 THORACIC DISC HERNIATION: ICD-10-CM

## 2023-10-25 DIAGNOSIS — Z15.89 MTHFR MUTATION: ICD-10-CM

## 2023-10-25 DIAGNOSIS — R63.5 WEIGHT GAIN: ICD-10-CM

## 2023-10-25 DIAGNOSIS — E88.819 INSULIN RESISTANCE: Primary | ICD-10-CM

## 2023-10-25 LAB
ABSOLUTE IMMATURE GRANULOCYTE: <0.03 K/UL (ref 0–0.58)
ALBUMIN SERPL-MCNC: 4.5 G/DL (ref 3.5–5.2)
ALP BLD-CCNC: 74 U/L (ref 35–104)
ALT SERPL-CCNC: 20 U/L (ref 0–32)
ANION GAP SERPL CALCULATED.3IONS-SCNC: 14 MMOL/L (ref 7–16)
AST SERPL-CCNC: 21 U/L (ref 0–31)
BASOPHILS ABSOLUTE: 0.05 K/UL (ref 0–0.2)
BASOPHILS RELATIVE PERCENT: 1 % (ref 0–2)
BILIRUB SERPL-MCNC: 0.2 MG/DL (ref 0–1.2)
BUN BLDV-MCNC: 11 MG/DL (ref 6–20)
CALCIUM SERPL-MCNC: 9.3 MG/DL (ref 8.6–10.2)
CHLORIDE BLD-SCNC: 101 MMOL/L (ref 98–107)
CHOLESTEROL: 198 MG/DL
CO2: 22 MMOL/L (ref 22–29)
CREAT SERPL-MCNC: 0.6 MG/DL (ref 0.5–1)
EOSINOPHILS ABSOLUTE: 0.08 K/UL (ref 0.05–0.5)
EOSINOPHILS RELATIVE PERCENT: 1 % (ref 0–6)
GFR SERPL CREATININE-BSD FRML MDRD: >60 ML/MIN/1.73M2
GLUCOSE BLD-MCNC: 118 MG/DL (ref 74–99)
HBA1C MFR BLD: 5.6 % (ref 4–5.6)
HCT VFR BLD CALC: 38.4 % (ref 34–48)
HDLC SERPL-MCNC: 46 MG/DL
HEMOGLOBIN: 12.9 G/DL (ref 11.5–15.5)
IMMATURE GRANULOCYTES: 0 % (ref 0–5)
LDL CHOLESTEROL: 127 MG/DL
LYMPHOCYTES ABSOLUTE: 1.93 K/UL (ref 1.5–4)
LYMPHOCYTES RELATIVE PERCENT: 22 % (ref 20–42)
MCH RBC QN AUTO: 30.1 PG (ref 26–35)
MCHC RBC AUTO-ENTMCNC: 33.6 G/DL (ref 32–34.5)
MCV RBC AUTO: 89.7 FL (ref 80–99.9)
MONOCYTES ABSOLUTE: 0.37 K/UL (ref 0.1–0.95)
MONOCYTES RELATIVE PERCENT: 4 % (ref 2–12)
NEUTROPHILS ABSOLUTE: 6.34 K/UL (ref 1.8–7.3)
NEUTROPHILS RELATIVE PERCENT: 72 % (ref 43–80)
PDW BLD-RTO: 12.1 % (ref 11.5–15)
PLATELET # BLD: 294 K/UL (ref 130–450)
PMV BLD AUTO: 9.5 FL (ref 7–12)
POTASSIUM SERPL-SCNC: 3.8 MMOL/L (ref 3.5–5)
RBC # BLD: 4.28 M/UL (ref 3.5–5.5)
SODIUM BLD-SCNC: 137 MMOL/L (ref 132–146)
TOTAL PROTEIN: 8 G/DL (ref 6.4–8.3)
TRIGL SERPL-MCNC: 125 MG/DL
TSH SERPL DL<=0.05 MIU/L-ACNC: 1.53 UIU/ML (ref 0.27–4.2)
VLDLC SERPL CALC-MCNC: 25 MG/DL
WBC # BLD: 8.8 K/UL (ref 4.5–11.5)

## 2023-10-25 PROCEDURE — G8484 FLU IMMUNIZE NO ADMIN: HCPCS | Performed by: FAMILY MEDICINE

## 2023-10-25 PROCEDURE — G8417 CALC BMI ABV UP PARAM F/U: HCPCS | Performed by: FAMILY MEDICINE

## 2023-10-25 PROCEDURE — 1036F TOBACCO NON-USER: CPT | Performed by: FAMILY MEDICINE

## 2023-10-25 PROCEDURE — 99214 OFFICE O/P EST MOD 30 MIN: CPT | Performed by: FAMILY MEDICINE

## 2023-10-25 PROCEDURE — 36415 COLL VENOUS BLD VENIPUNCTURE: CPT | Performed by: FAMILY MEDICINE

## 2023-10-25 PROCEDURE — G8427 DOCREV CUR MEDS BY ELIG CLIN: HCPCS | Performed by: FAMILY MEDICINE

## 2023-10-25 RX ORDER — DAPSONE 50 MG/G
GEL TOPICAL
COMMUNITY
Start: 2023-09-25

## 2023-10-25 RX ORDER — ACETAMINOPHEN AND CODEINE PHOSPHATE 300; 30 MG/1; MG/1
1 TABLET ORAL EVERY 6 HOURS PRN
COMMUNITY
Start: 2023-09-25

## 2023-10-25 ASSESSMENT — ENCOUNTER SYMPTOMS
TROUBLE SWALLOWING: 0
ABDOMINAL PAIN: 0
BACK PAIN: 1

## 2023-11-07 DIAGNOSIS — S39.92XD INJURY OF BACK, SUBSEQUENT ENCOUNTER: Primary | ICD-10-CM

## 2023-11-07 RX ORDER — NAPROXEN 500 MG/1
500 TABLET ORAL 2 TIMES DAILY WITH MEALS
Qty: 60 TABLET | Refills: 0 | Status: SHIPPED | OUTPATIENT
Start: 2023-11-07

## 2023-11-07 RX ORDER — CYCLOBENZAPRINE HCL 5 MG
5 TABLET ORAL 3 TIMES DAILY PRN
Qty: 30 TABLET | Refills: 0 | Status: SHIPPED | OUTPATIENT
Start: 2023-11-07 | End: 2023-11-17

## 2024-01-22 DIAGNOSIS — G56.11 RIGHT MEDIAN NERVE NEUROPATHY: ICD-10-CM

## 2024-01-22 DIAGNOSIS — F41.9 ANXIETY: ICD-10-CM

## 2024-01-22 RX ORDER — AMITRIPTYLINE HYDROCHLORIDE 100 MG/1
TABLET ORAL
Qty: 90 TABLET | Refills: 1 | Status: SHIPPED | OUTPATIENT
Start: 2024-01-22

## 2024-02-27 DIAGNOSIS — F41.8 ANXIETY WITH DEPRESSION: ICD-10-CM

## 2024-02-27 RX ORDER — VENLAFAXINE HYDROCHLORIDE 75 MG/1
75 CAPSULE, EXTENDED RELEASE ORAL DAILY
Qty: 30 CAPSULE | Refills: 5 | Status: SHIPPED | OUTPATIENT
Start: 2024-02-27

## 2024-04-28 ASSESSMENT — PATIENT HEALTH QUESTIONNAIRE - PHQ9
SUM OF ALL RESPONSES TO PHQ QUESTIONS 1-9: 9
8. MOVING OR SPEAKING SO SLOWLY THAT OTHER PEOPLE COULD HAVE NOTICED. OR THE OPPOSITE, BEING SO FIGETY OR RESTLESS THAT YOU HAVE BEEN MOVING AROUND A LOT MORE THAN USUAL: SEVERAL DAYS
SUM OF ALL RESPONSES TO PHQ QUESTIONS 1-9: 9
10. IF YOU CHECKED OFF ANY PROBLEMS, HOW DIFFICULT HAVE THESE PROBLEMS MADE IT FOR YOU TO DO YOUR WORK, TAKE CARE OF THINGS AT HOME, OR GET ALONG WITH OTHER PEOPLE: SOMEWHAT DIFFICULT
5. POOR APPETITE OR OVEREATING: NEARLY EVERY DAY
SUM OF ALL RESPONSES TO PHQ QUESTIONS 1-9: 9
4. FEELING TIRED OR HAVING LITTLE ENERGY: SEVERAL DAYS
2. FEELING DOWN, DEPRESSED OR HOPELESS: SEVERAL DAYS
SUM OF ALL RESPONSES TO PHQ QUESTIONS 1-9: 9
3. TROUBLE FALLING OR STAYING ASLEEP: SEVERAL DAYS
SUM OF ALL RESPONSES TO PHQ9 QUESTIONS 1 & 2: 2
6. FEELING BAD ABOUT YOURSELF - OR THAT YOU ARE A FAILURE OR HAVE LET YOURSELF OR YOUR FAMILY DOWN: SEVERAL DAYS
7. TROUBLE CONCENTRATING ON THINGS, SUCH AS READING THE NEWSPAPER OR WATCHING TELEVISION: NOT AT ALL
1. LITTLE INTEREST OR PLEASURE IN DOING THINGS: SEVERAL DAYS
9. THOUGHTS THAT YOU WOULD BE BETTER OFF DEAD, OR OF HURTING YOURSELF: NOT AT ALL

## 2024-04-29 ASSESSMENT — PATIENT HEALTH QUESTIONNAIRE - PHQ9
2. FEELING DOWN, DEPRESSED OR HOPELESS: SEVERAL DAYS
7. TROUBLE CONCENTRATING ON THINGS, SUCH AS READING THE NEWSPAPER OR WATCHING TELEVISION: NOT AT ALL
6. FEELING BAD ABOUT YOURSELF - OR THAT YOU ARE A FAILURE OR HAVE LET YOURSELF OR YOUR FAMILY DOWN: SEVERAL DAYS
1. LITTLE INTEREST OR PLEASURE IN DOING THINGS: SEVERAL DAYS
8. MOVING OR SPEAKING SO SLOWLY THAT OTHER PEOPLE COULD HAVE NOTICED. OR THE OPPOSITE - BEING SO FIDGETY OR RESTLESS THAT YOU HAVE BEEN MOVING AROUND A LOT MORE THAN USUAL: SEVERAL DAYS
4. FEELING TIRED OR HAVING LITTLE ENERGY: SEVERAL DAYS
SUM OF ALL RESPONSES TO PHQ QUESTIONS 1-9: 9
5. POOR APPETITE OR OVEREATING: NEARLY EVERY DAY
3. TROUBLE FALLING OR STAYING ASLEEP: SEVERAL DAYS
9. THOUGHTS THAT YOU WOULD BE BETTER OFF DEAD, OR OF HURTING YOURSELF: NOT AT ALL
10. IF YOU CHECKED OFF ANY PROBLEMS, HOW DIFFICULT HAVE THESE PROBLEMS MADE IT FOR YOU TO DO YOUR WORK, TAKE CARE OF THINGS AT HOME, OR GET ALONG WITH OTHER PEOPLE: SOMEWHAT DIFFICULT

## 2024-05-01 ENCOUNTER — OFFICE VISIT (OUTPATIENT)
Dept: FAMILY MEDICINE CLINIC | Age: 39
End: 2024-05-01

## 2024-05-01 VITALS
DIASTOLIC BLOOD PRESSURE: 89 MMHG | OXYGEN SATURATION: 98 % | SYSTOLIC BLOOD PRESSURE: 142 MMHG | RESPIRATION RATE: 18 BRPM | TEMPERATURE: 98.6 F | HEART RATE: 94 BPM | WEIGHT: 213.2 LBS | HEIGHT: 66 IN | BODY MASS INDEX: 34.27 KG/M2

## 2024-05-01 DIAGNOSIS — R10.9 FLANK PAIN: Primary | ICD-10-CM

## 2024-05-01 LAB
BILIRUBIN, POC: NORMAL
BLOOD URINE, POC: NORMAL
CLARITY, POC: CLEAR
COLOR, POC: NORMAL
GLUCOSE URINE, POC: NORMAL
KETONES, POC: NORMAL
LEUKOCYTE EST, POC: NORMAL
NITRITE, POC: NORMAL
PH, POC: 6.5
PROTEIN, POC: NORMAL
SPECIFIC GRAVITY, POC: <=1.005
UROBILINOGEN, POC: 0.2

## 2024-05-01 RX ORDER — TAMSULOSIN HYDROCHLORIDE 0.4 MG/1
0.4 CAPSULE ORAL DAILY
Qty: 30 CAPSULE | Refills: 5 | Status: SHIPPED | OUTPATIENT
Start: 2024-05-01

## 2024-05-01 RX ORDER — KETOROLAC TROMETHAMINE 10 MG/1
10 TABLET, FILM COATED ORAL DAILY
Qty: 10 TABLET | Refills: 0 | Status: SHIPPED | OUTPATIENT
Start: 2024-05-01 | End: 2024-05-11

## 2024-05-01 NOTE — PROGRESS NOTES
[facility]  Department of Family Medicine  Family Medicine Residency Program      Patient: Lyn Gerardo  1985 39 y.o. female     Date of Service: 5/1/2024      Chief complaint:   Chief Complaint   Patient presents with    Flank Pain     Left side       HISTORY OF PRESENTING ILLNESS     Lyn Gerardo is a 39 y.o. female presented to the clinic with complaints of  flank pain     Has had a history of kidney stones in the past , b/l and this feels very similar.   Followed with Osei Wray MD, Urology, [place], but it has been over 4 years.   Not sure what kind of stones they were , she had to blast it.     Left flank Pain:  Intensity 7/10  Quality burning, aching , constant   Onset 3 days , but started 1.5 month ago pain in the tailbone , no falls, no trauma   Progression - getting worse   Radiation left radiating to the front   Alleviating  tried ibupforen, toradol - helped a little, but kept her up night , heating seats,   Aggrevating - moving,   Associated symptoms - can't sleep throught out the night, no fevers, no blood in the urine, no burning   Denies fever, chills, chest pain, shortness of breath, abdominal pain, nausea, vomiting, diarrhea, numbness, tingling, loss of bowel or bladder control        Past Medical History:      Diagnosis Date    Blood type, Rh negative     Gestational diabetes     pt takes lantus 28 units nightly    H/O bleeding disorder     Keratosis palmoplantaris with corneal dystrophy (HCC)     Dr Falcon in [place]     Kidney stones     MTHFR mutation     homozygous     Past Surgical History:        Procedure Laterality Date    CARPAL TUNNEL RELEASE Bilateral 2023    Dr Lewis at [facility]    DILATION AND CURETTAGE  2012    ELBOW SURGERY Bilateral     median nerve release    EYE SURGERY  02/15/2023    FACIAL COSMETIC SURGERY      mole removal    KIDNEY STONE SURGERY  2016    lithotripsy     Allergies:    Cymbalta [duloxetine hcl],

## 2024-05-01 NOTE — PROGRESS NOTES
[facility]  Precepting [facility]    Subjective:  Left flank pain, worse over the past 3 days, onset 1.5 months ago  No improvement with Ibuprofen, Tried 's Toradol with some improvement  Hx of kidney stones  Denies hematuria    ROS otherwise negative     Past medical, surgical, family and social history were reviewed, non-contributory, and unchanged unless otherwise stated.    Objective:    BP (!) 142/89   Pulse 94   Temp 98.6 °F (37 °C)   Resp 18   Ht 1.676 m (5' 6\")   Wt 96.7 kg (213 lb 3.2 oz)   LMP 04/10/2024 (Approximate)   SpO2 98%   BMI 34.41 kg/m²     Exam is as noted by resident with the following changes, additions or corrections:    General:  NAD; alert & oriented x 3   Heart:  RRR, no murmurs, gallops, or rubs.  Lungs:  CTA bilaterally, no wheeze, rales or rhonchi  Abd: bowel sounds present, nontender, nondistended, no masses. Left CVA tenderness  Extrem:  No clubbing, cyanosis, or edema    Assessment/Plan:  Left flank pain- UA, non contrast CT, Toradol prn, refer to Urology     Attending Physician Statement  I have reviewed the chart, including any radiology or labs. I have discussed the case, including pertinent history and exam findings with the resident.  I agree with the assessment, plan and orders as documented by the resident.  Please refer to the resident note for additional information.      Electronically signed by Nuzhat Peng MD on 5/1/2024 at 1:19 PM

## 2024-05-03 DIAGNOSIS — R10.9 FLANK PAIN: ICD-10-CM

## 2024-05-13 ENCOUNTER — OFFICE VISIT (OUTPATIENT)
Dept: FAMILY MEDICINE CLINIC | Age: 39
End: 2024-05-13
Payer: COMMERCIAL

## 2024-05-13 ENCOUNTER — HOSPITAL ENCOUNTER (OUTPATIENT)
Dept: GENERAL RADIOLOGY | Age: 39
Discharge: HOME | End: 2024-05-15
Payer: COMMERCIAL

## 2024-05-13 ENCOUNTER — HOSPITAL ENCOUNTER (OUTPATIENT)
Age: 39
Discharge: HOME | End: 2024-05-15
Payer: COMMERCIAL

## 2024-05-13 VITALS
WEIGHT: 212.96 LBS | OXYGEN SATURATION: 99 % | BODY MASS INDEX: 34.23 KG/M2 | SYSTOLIC BLOOD PRESSURE: 136 MMHG | RESPIRATION RATE: 18 BRPM | HEART RATE: 106 BPM | DIASTOLIC BLOOD PRESSURE: 88 MMHG | TEMPERATURE: 98.5 F | HEIGHT: 66 IN

## 2024-05-13 DIAGNOSIS — F41.8 ANXIETY WITH DEPRESSION: ICD-10-CM

## 2024-05-13 DIAGNOSIS — R92.8 ABNORMAL MAMMOGRAM: ICD-10-CM

## 2024-05-13 DIAGNOSIS — M25.552 LEFT HIP PAIN: Primary | ICD-10-CM

## 2024-05-13 DIAGNOSIS — F43.23 ADJUSTMENT DISORDER WITH MIXED ANXIETY AND DEPRESSED MOOD: ICD-10-CM

## 2024-05-13 DIAGNOSIS — M25.552 LEFT HIP PAIN: ICD-10-CM

## 2024-05-13 PROCEDURE — 1036F TOBACCO NON-USER: CPT | Performed by: FAMILY MEDICINE

## 2024-05-13 PROCEDURE — 72220 X-RAY EXAM SACRUM TAILBONE: CPT

## 2024-05-13 PROCEDURE — G8417 CALC BMI ABV UP PARAM F/U: HCPCS | Performed by: FAMILY MEDICINE

## 2024-05-13 PROCEDURE — 99213 OFFICE O/P EST LOW 20 MIN: CPT | Performed by: FAMILY MEDICINE

## 2024-05-13 PROCEDURE — G8427 DOCREV CUR MEDS BY ELIG CLIN: HCPCS | Performed by: FAMILY MEDICINE

## 2024-05-13 PROCEDURE — 73502 X-RAY EXAM HIP UNI 2-3 VIEWS: CPT

## 2024-05-13 ASSESSMENT — ENCOUNTER SYMPTOMS
TROUBLE SWALLOWING: 0
BACK PAIN: 1
ABDOMINAL PAIN: 0

## 2024-05-13 NOTE — PATIENT INSTRUCTIONS
[facility]  Address: [address] [place] [address] [address], [place] [address]  Phone: [phone]    [facility]

## 2024-05-13 NOTE — PROGRESS NOTES
Pupils are equal, round, and reactive to light.   Cardiovascular:      Rate and Rhythm: Normal rate and regular rhythm.      Pulses: Normal pulses.      Heart sounds: No murmur heard.  Pulmonary:      Breath sounds: Normal breath sounds. No wheezing, rhonchi or rales.   Abdominal:      General: Bowel sounds are normal. There is no distension.      Palpations: Abdomen is soft.      Tenderness: There is no abdominal tenderness.   Musculoskeletal:         General: Normal range of motion.      Right elbow: Normal range of motion.      Left elbow: Normal range of motion.      Right wrist: Normal.      Left wrist: Normal.      Cervical back: Neck supple.      Thoracic back: Tenderness (midline) present. No deformity. Normal range of motion.      Lumbar back: Bony tenderness (Left SI joint) present. No tenderness. Negative right straight leg raise test and negative left straight leg raise test.      Left hip: Bony tenderness (On external rotation and abduction) present. Normal range of motion.      Right lower leg: No edema.      Left lower leg: No edema.   Lymphadenopathy:      Cervical: No cervical adenopathy.   Neurological:      Mental Status: She is alert.   Psychiatric:         Attention and Perception: Attention normal.         Mood and Affect: Mood normal. Affect is tearful.         Behavior: Behavior normal. Behavior is cooperative.         Thought Content: Thought content normal.         Judgment: Judgment normal.              An electronic signature was used to authenticate this note.    --Nir Sarabia MD

## 2024-06-05 ENCOUNTER — TELEPHONE (OUTPATIENT)
Dept: FAMILY MEDICINE CLINIC | Age: 39
End: 2024-06-05

## 2024-06-05 NOTE — TELEPHONE ENCOUNTER
----- Message from Itzel DRE Reid sent at 6/4/2024  4:06 PM EDT -----  Regarding: Peer to Peer request for MRI Lumbar  Please be advised a preliminary decision was reached regarding this patient's upcoming MRI Lumbar that is scheduled on 06/05/2024. Although the most recent office visit note and previous MRI was submitted with the request, it is not found to be medically necessary. The denial letter will be attached to the referral in the patient's chart for your reference. A peer to peer is being requested at this time. The details are as follows:     Case ID: [id number]    Rationale:   Your healthcare provider told us that you are having lower back pain that travels to your hip and/or leg. The request cannot be approved because:    Imaging requires six weeks of provider directed treatment to be completed. Supported treatments include (but are not limited to) drugs for swelling or pain, an in office workout (physical therapy), and/or oral or injected steroids. This must have been completed in the past three months without improved symptoms. Contact (via office visit, phone, email, or messaging) must occur after the treatment is completed. This has not been met because:  You have not completed six weeks of provider directed treatment.  The provider directed treatment did not occur within the last three months.  There was no contact with your provider after completing treatment.      This finding was based on review of [facility] [facility] Guidelines Section(s): Lower Extremity Pain with Neurological Features (Radiculopathy, Radiculitis, or Plexopathy and Neuropathy) with or without Low Back (Lumbar Spine) Pain (SP 6.1) and 1.0 General Guidelines.         Peer to Peer information:    [id number] option 4 to discuss the decision prior to an appeal        Please contact the patient to insure they are aware of the next steps. I attempted to contact the patient to reschedule but had to leave a HIPAA compliant vm

## 2024-06-07 NOTE — TELEPHONE ENCOUNTER
Sorry.     MRI you ordered was denied. Based on the following:     Imaging requires six weeks of provider directed treatment to be completed. Supported treatments include (but are not limited to) drugs for swelling or pain, an in office workout (physical therapy), and/or oral or injected steroids. This must have been completed in the past three months without improved symptoms. Contact (via office visit, phone, email, or messaging) must occur after the treatment is completed. This has not been met because:  You have not completed six weeks of provider directed treatment.  The provider directed treatment did not occur within the last three months.  There was no contact with your provider after completing treatment.        You can do a peer to peer to try to get approved if you wish.     Peer to Peer information:     [phone] option 4 to discuss the decision prior to an appeal    Case ID: [id number]

## 2024-08-06 DIAGNOSIS — F41.9 ANXIETY: ICD-10-CM

## 2024-08-06 DIAGNOSIS — G56.11 RIGHT MEDIAN NERVE NEUROPATHY: ICD-10-CM

## 2024-08-06 RX ORDER — AMITRIPTYLINE HYDROCHLORIDE 100 MG/1
100 TABLET ORAL NIGHTLY
Qty: 90 TABLET | Refills: 1 | Status: SHIPPED | OUTPATIENT
Start: 2024-08-06

## 2024-08-06 RX ORDER — AMITRIPTYLINE HYDROCHLORIDE 100 MG/1
TABLET ORAL
Qty: 90 TABLET | Refills: 1 | OUTPATIENT
Start: 2024-08-06

## 2024-09-11 ENCOUNTER — OFFICE VISIT (OUTPATIENT)
Dept: FAMILY MEDICINE CLINIC | Age: 39
End: 2024-09-11
Payer: COMMERCIAL

## 2024-09-11 VITALS
HEIGHT: 66 IN | OXYGEN SATURATION: 99 % | BODY MASS INDEX: 34.87 KG/M2 | TEMPERATURE: 97.3 F | RESPIRATION RATE: 18 BRPM | HEART RATE: 92 BPM | WEIGHT: 217 LBS | DIASTOLIC BLOOD PRESSURE: 89 MMHG | SYSTOLIC BLOOD PRESSURE: 150 MMHG

## 2024-09-11 DIAGNOSIS — M25.551 BILATERAL HIP PAIN: ICD-10-CM

## 2024-09-11 DIAGNOSIS — M25.50 PAIN, JOINT, MULTIPLE SITES: Primary | ICD-10-CM

## 2024-09-11 DIAGNOSIS — S16.1XXD STRAIN OF NECK MUSCLE, SUBSEQUENT ENCOUNTER: ICD-10-CM

## 2024-09-11 DIAGNOSIS — G56.11 RIGHT MEDIAN NERVE NEUROPATHY: ICD-10-CM

## 2024-09-11 DIAGNOSIS — M25.552 BILATERAL HIP PAIN: ICD-10-CM

## 2024-09-11 DIAGNOSIS — F41.9 ANXIETY: ICD-10-CM

## 2024-09-11 LAB
ALBUMIN: 4.4 G/DL (ref 3.5–5.2)
ALP BLD-CCNC: 72 U/L (ref 35–104)
ALT SERPL-CCNC: 29 U/L (ref 0–32)
ANION GAP SERPL CALCULATED.3IONS-SCNC: 17 MMOL/L (ref 7–16)
AST SERPL-CCNC: 28 U/L (ref 0–31)
BASOPHILS ABSOLUTE: 0.06 K/UL (ref 0–0.2)
BASOPHILS RELATIVE PERCENT: 1 % (ref 0–2)
BILIRUB SERPL-MCNC: <0.2 MG/DL (ref 0–1.2)
BUN BLDV-MCNC: 11 MG/DL (ref 6–20)
C-REACTIVE PROTEIN: <3 MG/L (ref 0–5)
CALCIUM SERPL-MCNC: 9 MG/DL (ref 8.6–10.2)
CHLORIDE BLD-SCNC: 103 MMOL/L (ref 98–107)
CO2: 21 MMOL/L (ref 22–29)
CREAT SERPL-MCNC: 0.5 MG/DL (ref 0.5–1)
EOSINOPHILS ABSOLUTE: 0.11 K/UL (ref 0.05–0.5)
EOSINOPHILS RELATIVE PERCENT: 1 % (ref 0–6)
GFR, ESTIMATED: >90 ML/MIN/1.73M2
GLUCOSE BLD-MCNC: 115 MG/DL (ref 74–99)
HCT VFR BLD CALC: 40.5 % (ref 34–48)
HEMOGLOBIN: 13.2 G/DL (ref 11.5–15.5)
IMMATURE GRANULOCYTES %: 0 % (ref 0–5)
IMMATURE GRANULOCYTES ABSOLUTE: <0.03 K/UL (ref 0–0.58)
LYMPHOCYTES ABSOLUTE: 2.44 K/UL (ref 1.5–4)
LYMPHOCYTES RELATIVE PERCENT: 26 % (ref 20–42)
MCH RBC QN AUTO: 29.8 PG (ref 26–35)
MCHC RBC AUTO-ENTMCNC: 32.6 G/DL (ref 32–34.5)
MCV RBC AUTO: 91.4 FL (ref 80–99.9)
MONOCYTES ABSOLUTE: 0.41 K/UL (ref 0.1–0.95)
MONOCYTES RELATIVE PERCENT: 4 % (ref 2–12)
NEUTROPHILS ABSOLUTE: 6.52 K/UL (ref 1.8–7.3)
NEUTROPHILS RELATIVE PERCENT: 68 % (ref 43–80)
PDW BLD-RTO: 12.7 % (ref 11.5–15)
PLATELET # BLD: 286 K/UL (ref 130–450)
PMV BLD AUTO: 9.5 FL (ref 7–12)
POTASSIUM SERPL-SCNC: 3.9 MMOL/L (ref 3.5–5)
RBC # BLD: 4.43 M/UL (ref 3.5–5.5)
SED RATE, AUTOMATED: 6 MM/HR (ref 0–20)
SODIUM BLD-SCNC: 141 MMOL/L (ref 132–146)
TOTAL PROTEIN: 7.6 G/DL (ref 6.4–8.3)
TSH SERPL DL<=0.05 MIU/L-ACNC: 1.96 UIU/ML (ref 0.27–4.2)
VITAMIN D 25-HYDROXY: 18.1 NG/ML (ref 30–100)
WBC # BLD: 9.6 K/UL (ref 4.5–11.5)

## 2024-09-11 PROCEDURE — G8417 CALC BMI ABV UP PARAM F/U: HCPCS | Performed by: FAMILY MEDICINE

## 2024-09-11 PROCEDURE — 36415 COLL VENOUS BLD VENIPUNCTURE: CPT | Performed by: FAMILY MEDICINE

## 2024-09-11 PROCEDURE — G8427 DOCREV CUR MEDS BY ELIG CLIN: HCPCS | Performed by: FAMILY MEDICINE

## 2024-09-11 PROCEDURE — 1036F TOBACCO NON-USER: CPT | Performed by: FAMILY MEDICINE

## 2024-09-11 PROCEDURE — 99214 OFFICE O/P EST MOD 30 MIN: CPT | Performed by: FAMILY MEDICINE

## 2024-09-11 RX ORDER — AMITRIPTYLINE HYDROCHLORIDE 150 MG/1
150 TABLET ORAL NIGHTLY
Qty: 90 TABLET | Refills: 1 | Status: SHIPPED | OUTPATIENT
Start: 2024-09-11

## 2024-09-11 RX ORDER — ACETAMINOPHEN AND CODEINE PHOSPHATE 300; 30 MG/1; MG/1
1 TABLET ORAL EVERY 6 HOURS PRN
COMMUNITY
Start: 2024-08-20

## 2024-09-11 RX ORDER — GABAPENTIN 300 MG/1
CAPSULE ORAL
Qty: 90 CAPSULE | Refills: 2 | Status: SHIPPED | OUTPATIENT
Start: 2024-09-11 | End: 2024-12-24

## 2024-09-11 ASSESSMENT — ENCOUNTER SYMPTOMS
SHORTNESS OF BREATH: 0
COLOR CHANGE: 0
EYE PAIN: 0

## 2024-09-12 LAB — ANTI-NUCLEAR ANTIBODY (ANA): NEGATIVE

## 2024-09-13 LAB — CCP IGG ANTIBODIES: <0.4 U/ML (ref 0–7)

## 2024-09-14 ENCOUNTER — HOSPITAL ENCOUNTER (OUTPATIENT)
Dept: GENERAL RADIOLOGY | Age: 39
Discharge: HOME | End: 2024-09-16
Payer: COMMERCIAL

## 2024-09-14 ENCOUNTER — HOSPITAL ENCOUNTER (OUTPATIENT)
Age: 39
Discharge: HOME | End: 2024-09-16
Payer: COMMERCIAL

## 2024-09-14 DIAGNOSIS — M25.551 BILATERAL HIP PAIN: ICD-10-CM

## 2024-09-14 DIAGNOSIS — M25.552 BILATERAL HIP PAIN: ICD-10-CM

## 2024-09-14 DIAGNOSIS — S16.1XXD STRAIN OF NECK MUSCLE, SUBSEQUENT ENCOUNTER: ICD-10-CM

## 2024-09-14 DIAGNOSIS — M25.50 PAIN, JOINT, MULTIPLE SITES: ICD-10-CM

## 2024-09-14 PROCEDURE — 72050 X-RAY EXAM NECK SPINE 4/5VWS: CPT

## 2024-09-14 PROCEDURE — 73502 X-RAY EXAM HIP UNI 2-3 VIEWS: CPT

## 2024-10-23 ENCOUNTER — OFFICE VISIT (OUTPATIENT)
Dept: PHYSICAL MEDICINE AND REHAB | Age: 39
End: 2024-10-23
Payer: COMMERCIAL

## 2024-10-23 VITALS
HEIGHT: 66 IN | BODY MASS INDEX: 35.11 KG/M2 | OXYGEN SATURATION: 97 % | HEART RATE: 97 BPM | DIASTOLIC BLOOD PRESSURE: 86 MMHG | WEIGHT: 218.48 LBS | SYSTOLIC BLOOD PRESSURE: 138 MMHG

## 2024-10-23 DIAGNOSIS — G47.9 SLEEP DIFFICULTIES: ICD-10-CM

## 2024-10-23 DIAGNOSIS — M54.16 LUMBAR RADICULITIS: ICD-10-CM

## 2024-10-23 DIAGNOSIS — G56.21 NEURITIS OF RIGHT ULNAR NERVE: ICD-10-CM

## 2024-10-23 DIAGNOSIS — R10.32 LEFT GROIN PAIN: ICD-10-CM

## 2024-10-23 DIAGNOSIS — M51.362 DEGENERATION OF INTERVERTEBRAL DISC OF LUMBAR REGION WITH DISCOGENIC BACK PAIN AND LOWER EXTREMITY PAIN: ICD-10-CM

## 2024-10-23 DIAGNOSIS — G89.4 CHRONIC PAIN SYNDROME: Primary | ICD-10-CM

## 2024-10-23 PROCEDURE — 99204 OFFICE O/P NEW MOD 45 MIN: CPT | Performed by: STUDENT IN AN ORGANIZED HEALTH CARE EDUCATION/TRAINING PROGRAM

## 2024-10-23 PROCEDURE — G8427 DOCREV CUR MEDS BY ELIG CLIN: HCPCS | Performed by: STUDENT IN AN ORGANIZED HEALTH CARE EDUCATION/TRAINING PROGRAM

## 2024-10-23 PROCEDURE — 1036F TOBACCO NON-USER: CPT | Performed by: STUDENT IN AN ORGANIZED HEALTH CARE EDUCATION/TRAINING PROGRAM

## 2024-10-23 PROCEDURE — G8484 FLU IMMUNIZE NO ADMIN: HCPCS | Performed by: STUDENT IN AN ORGANIZED HEALTH CARE EDUCATION/TRAINING PROGRAM

## 2024-10-23 PROCEDURE — G8417 CALC BMI ABV UP PARAM F/U: HCPCS | Performed by: STUDENT IN AN ORGANIZED HEALTH CARE EDUCATION/TRAINING PROGRAM

## 2024-10-23 RX ORDER — TRAZODONE HYDROCHLORIDE 50 MG/1
25 TABLET, FILM COATED ORAL NIGHTLY PRN
Qty: 30 TABLET | Refills: 0 | Status: SHIPPED | OUTPATIENT
Start: 2024-10-23

## 2024-10-23 NOTE — PROGRESS NOTES
consistent with carpal tunnel syndrome.    There were no other peripheral neuropathies. There were no motor radiculopathies or intracanalicular lesions. Sensory radiculopathies cannot be evaluated electrodiagnostic means.     Lab Results   Component Value Date/Time     09/11/2024 03:36 PM    K 3.9 09/11/2024 03:36 PM    K 3.3 11/19/2019 03:13 PM     09/11/2024 03:36 PM    CO2 21 09/11/2024 03:36 PM    BUN 11 09/11/2024 03:36 PM    CREATININE 0.5 09/11/2024 03:36 PM    GLUCOSE 115 09/11/2024 03:36 PM    CALCIUM 9.0 09/11/2024 03:36 PM    LABGLOM >90 09/11/2024 03:36 PM    LABGLOM >60 10/25/2023 03:58 PM       Lab Results   Component Value Date    WBC 9.6 09/11/2024    HGB 13.2 09/11/2024    HCT 40.5 09/11/2024    MCV 91.4 09/11/2024     09/11/2024      Lab Results   Component Value Date    SEDRATE 6 09/11/2024      Lab Results   Component Value Date    CRP <3.0 09/11/2024        ASSESSMENT AND PLAN  1. Chronic pain syndrome  -     MRI LUMBAR SPINE WO CONTRAST; Future  -     MRI HIP LEFT WO CONTRAST; Future  -     Amb External Referral To Physical Therapy  2. Lumbar radiculitis  -     MRI LUMBAR SPINE WO CONTRAST; Future  -     MRI HIP LEFT WO CONTRAST; Future  -     Amb External Referral To Physical Therapy  3. Degeneration of intervertebral disc of lumbar region with discogenic back pain and lower extremity pain  -     MRI LUMBAR SPINE WO CONTRAST; Future  -     Amb External Referral To Physical Therapy  4. Neuritis of right ulnar nerve  -     EMG  -     Amb External Referral To Physical Therapy  5. Sleep difficulties  -     Amb External Referral To Physical Therapy  6. Left groin pain  -     MRI HIP LEFT WO CONTRAST; Future  -     Amb External Referral To Physical Therapy       Lyn Gerardo is a 39 y.o. female who presents for evaluation of chronic right ulnar neuritis in the setting of prior ulnar nerve surgery, left groin pain, diffuse low back pain, and lumbar radiculitis. Will start

## 2024-10-29 DIAGNOSIS — F41.8 ANXIETY WITH DEPRESSION: ICD-10-CM

## 2024-10-29 RX ORDER — VENLAFAXINE HYDROCHLORIDE 75 MG/1
75 CAPSULE, EXTENDED RELEASE ORAL DAILY
Qty: 90 CAPSULE | Refills: 1 | Status: SHIPPED | OUTPATIENT
Start: 2024-10-29

## 2024-10-29 NOTE — TELEPHONE ENCOUNTER
Name of Medication(s) Requested:  Requested Prescriptions     Pending Prescriptions Disp Refills    venlafaxine (EFFEXOR XR) 75 MG extended release capsule 90 capsule 1     Sig: Take 1 capsule by mouth daily       Medication is on current medication list Yes    Dosage and directions were verified? Yes    Quantity verified: 90 day supply     Pharmacy Verified?  Yes    Last Appointment:  9/11/2024    Future appts:  Future Appointments   Date Time Provider Department Center   11/21/2024  9:30 AM [facility]LE [facility]   11/21/2024 10:15 AM [facility] Rad[facility]   11/21/2024  1:00 PM Jose Manuel Elkins Jr., MD [facility] NEUR[facility]   12/2/2024  2:00 PM Nir Sarabia MD [facility]   12/11/2024  1:00 PM Gia Lim MD [facility] PMR 2 [facility]   2/18/2025  8:30 AM Nir Sarabia MD [facility]   5/30/2025  8:20 AM Amari Garrison MD [facility]        (If no appt send self scheduling link. .REFILLAPPT)  Scheduling request sent?     [] Yes  [x] No    Does patient need updated?  [] Yes  [x] No

## 2024-11-19 ENCOUNTER — TELEPHONE (OUTPATIENT)
Dept: FAMILY MEDICINE CLINIC | Age: 39
End: 2024-11-19

## 2024-11-19 NOTE — TELEPHONE ENCOUNTER
The lab has notified our office that your lab order for Vitamin D on date of service 9/11/24 was rejected for not meeting medical necessity.  Additional paperwork is located in your folder.  Please provide a different appropriate billing code if one exists.        Action Needed  -Please record any billing code changes in this encounter.  (EPIC record of your coding is needed)   -Return the paperwork in your folder so that this can be returned to the lab (you do not need to copy the code to the paperwork)

## 2024-11-21 ENCOUNTER — HOSPITAL ENCOUNTER (OUTPATIENT)
Dept: MRI IMAGING | Age: 39
Discharge: HOME | End: 2024-11-23
Attending: STUDENT IN AN ORGANIZED HEALTH CARE EDUCATION/TRAINING PROGRAM
Payer: COMMERCIAL

## 2024-11-21 ENCOUNTER — HOSPITAL ENCOUNTER (OUTPATIENT)
Dept: NEUROLOGY | Age: 39
Discharge: HOME | End: 2024-11-21
Payer: COMMERCIAL

## 2024-11-21 VITALS — HEIGHT: 66 IN | WEIGHT: 218 LBS | BODY MASS INDEX: 35.03 KG/M2

## 2024-11-21 DIAGNOSIS — R10.32 LEFT GROIN PAIN: ICD-10-CM

## 2024-11-21 DIAGNOSIS — M54.16 LUMBAR RADICULITIS: ICD-10-CM

## 2024-11-21 DIAGNOSIS — G89.4 CHRONIC PAIN SYNDROME: ICD-10-CM

## 2024-11-21 DIAGNOSIS — M51.362 DEGENERATION OF INTERVERTEBRAL DISC OF LUMBAR REGION WITH DISCOGENIC BACK PAIN AND LOWER EXTREMITY PAIN: ICD-10-CM

## 2024-11-21 PROCEDURE — 95886 MUSC TEST DONE W/N TEST COMP: CPT

## 2024-11-21 PROCEDURE — 73721 MRI JNT OF LWR EXTRE W/O DYE: CPT

## 2024-11-21 PROCEDURE — 72148 MRI LUMBAR SPINE W/O DYE: CPT

## 2024-11-21 PROCEDURE — 95910 NRV CNDJ TEST 7-8 STUDIES: CPT

## 2024-11-21 NOTE — PROCEDURES
indicis proprius N None None None None N N N N   R. Brachioradialis N None None None None N N N N   R. Biceps brachii N None None None None N N N N   R. Triceps brachii N None None None None N N N N   R. Deltoid N None None None None N N N N   R. Cervical paraspinals (low) N None None None None N N N N   R. Cervical paraspinals (mid) N None None None None N N N N         Nerve conduction studies in the right arm found no abnormalities.    These findings were compared to the referential values in this laboratory, available upon request.    Monopolar needle examination of the right arm was also unrevealing.    Electrodiagnostic examination of the right arm was normal at this time.  There were no peripheral neuropathies, specifically no evidence of ulnar neuropathy about the elbow segments or median neuropathy at/or distal to the wrist.  There were no motor radiculopathies or intracanalicular lesions.  Sensory radiculopathies cannot be evaluated by electrodiagnostic means.    Clinically, the patient underwent bilateral ulnar nerve transpositions.  She still reported pain along the right ulnar groove, with occasional numbness in the fourth and fifth digits of that hand.    On brief neurological examination, I found no motor or sensory compromise.  There was marked tenderness at the right ulnar groove.  Her difficulties are from persistent pain at that surgical site, possibly from scar formation.      I question performing ultrasonography at that site, looking for scar formation and possible nerve compression.    Clinical correlation was highly advised.

## 2024-12-11 ENCOUNTER — OFFICE VISIT (OUTPATIENT)
Dept: PHYSICAL MEDICINE AND REHAB | Age: 39
End: 2024-12-11
Payer: COMMERCIAL

## 2024-12-11 VITALS
HEART RATE: 97 BPM | HEIGHT: 66 IN | WEIGHT: 220.68 LBS | OXYGEN SATURATION: 95 % | BODY MASS INDEX: 35.47 KG/M2 | TEMPERATURE: 97.8 F | SYSTOLIC BLOOD PRESSURE: 130 MMHG | DIASTOLIC BLOOD PRESSURE: 88 MMHG

## 2024-12-11 DIAGNOSIS — G89.4 CHRONIC PAIN SYNDROME: ICD-10-CM

## 2024-12-11 DIAGNOSIS — M25.552 GREATER TROCHANTERIC PAIN SYNDROME OF LEFT LOWER EXTREMITY: Primary | ICD-10-CM

## 2024-12-11 DIAGNOSIS — M51.362 DEGENERATION OF INTERVERTEBRAL DISC OF LUMBAR REGION WITH DISCOGENIC BACK PAIN AND LOWER EXTREMITY PAIN: ICD-10-CM

## 2024-12-11 DIAGNOSIS — G56.21 NEURITIS OF RIGHT ULNAR NERVE: ICD-10-CM

## 2024-12-11 DIAGNOSIS — R10.32 LEFT GROIN PAIN: ICD-10-CM

## 2024-12-11 PROCEDURE — 99213 OFFICE O/P EST LOW 20 MIN: CPT | Performed by: STUDENT IN AN ORGANIZED HEALTH CARE EDUCATION/TRAINING PROGRAM

## 2024-12-11 PROCEDURE — 20610 DRAIN/INJ JOINT/BURSA W/O US: CPT | Performed by: STUDENT IN AN ORGANIZED HEALTH CARE EDUCATION/TRAINING PROGRAM

## 2024-12-11 PROCEDURE — G8484 FLU IMMUNIZE NO ADMIN: HCPCS | Performed by: STUDENT IN AN ORGANIZED HEALTH CARE EDUCATION/TRAINING PROGRAM

## 2024-12-11 PROCEDURE — G8427 DOCREV CUR MEDS BY ELIG CLIN: HCPCS | Performed by: STUDENT IN AN ORGANIZED HEALTH CARE EDUCATION/TRAINING PROGRAM

## 2024-12-11 PROCEDURE — G8417 CALC BMI ABV UP PARAM F/U: HCPCS | Performed by: STUDENT IN AN ORGANIZED HEALTH CARE EDUCATION/TRAINING PROGRAM

## 2024-12-11 PROCEDURE — 1036F TOBACCO NON-USER: CPT | Performed by: STUDENT IN AN ORGANIZED HEALTH CARE EDUCATION/TRAINING PROGRAM

## 2024-12-11 RX ORDER — TRIAMCINOLONE ACETONIDE 40 MG/ML
40 INJECTION, SUSPENSION INTRA-ARTICULAR; INTRAMUSCULAR ONCE
Status: COMPLETED | OUTPATIENT
Start: 2024-12-11 | End: 2024-12-11

## 2024-12-11 RX ORDER — LIDOCAINE HYDROCHLORIDE 10 MG/ML
2 INJECTION, SOLUTION EPIDURAL; INFILTRATION; INTRACAUDAL; PERINEURAL ONCE
Qty: 2 ML | Refills: 0 | Status: CANCELLED
Start: 2024-12-11 | End: 2024-12-11

## 2024-12-11 RX ADMIN — Medication 3 ML: at 16:04

## 2024-12-11 RX ADMIN — TRIAMCINOLONE ACETONIDE 40 MG: 40 INJECTION, SUSPENSION INTRA-ARTICULAR; INTRAMUSCULAR at 16:05

## 2024-12-11 NOTE — PATIENT INSTRUCTIONS
The injection consists of numbing agent Lidocaine, in addition to a corticosteroid, Kenalog.    Post Injection Care:  Apply ice to the area that was injected for 20 minutes at a time, intermittently for the next 2 days.  Please allow at least 1-2 hours between icing until the skin returns to normal temperature and color.  Please refrain from strenuous activities for the next 3 days.  This will help alleviate any discomfort from the injection and help prevent a post injection \"flare-up\".    Possible Side Effects:  Very few people may experience increased discomfort for a few days after the injection. This is typically called a post injection \"flare-up.”  Other possible side effects include injection site tenderness, mild soreness, swelling, warmth or redness, infection. If you are a diabetic, watch for increased sugar readings over the next week.    If you experience the following symptoms, please call our office:  -Fever over 100.4 degrees  -Significantly increased redness or warmth  -Excessive swelling  -Drainage

## 2024-12-12 NOTE — PROGRESS NOTES
12/12/2024    Chief Complaint   Patient presents with    Follow-up     Patient is here for follow up MRI and EMG results. Patient states she has not started PT.        Last injection: None  Taking anticoagulants/antiplatelets: No  Diabetic: No  Febrile/active infection: No    Ambulatory Procedure Time Out  Correct Patient: Yes  Correct Procedure: Yes  Correct Site/Side: Yes  Correct Site(s) Marked: Yes  Informed Consent Signed: Yes  Allergies Verified: Yes  Staff Present & Credential:: HM, LPN    Diagnosis:  1. Greater trochanteric pain syndrome of left lower extremity    2. Chronic pain syndrome    3. Degeneration of intervertebral disc of lumbar region with discogenic back pain and lower extremity pain    4. Neuritis of right ulnar nerve    5. Left groin pain        After explaining the indications, risks, benefits and alternatives of a Left trochanteric bursa injection, the patient agreed to proceed.  A permit was signed and scanned into the chart. The patient was placed in the Right lateral decubitus position and draped appropriately for modesty.  The skin on the lateral hip was prepared with Chloraprep.  Ethyl Chloride vapocoolant spray was used for local anesthesia. Using aseptic no touch technique, a 22 gauge, 1.5\" needle with 1 cc of Kenalog 40 mg/cc and 3 cc of lidocaine 1% was directed to the trochanteric bursa.  After negative aspiration, the medication was injected in a fanning out method.  Adequate hemostasis was achieved and a bandage applied.   The patient tolerated the procedure well and was educated in post injection care.  The patient was clinically monitored after the injection and left the office without incident.     Administrations This Visit       lidocaine 1 % injection 3 mL       Admin Date  12/11/2024  16:04 Action  Given Dose  3 mL Route  Other Site  Hip Left Documented By  Jojo Murray LPN    NDC: 4705-0979-52    Lot#: [id number]    : HOSPIRA    Patient Supplied?: No              
reassuring without tendon injury or intraarticular compromise of the hip  MRI lumbar with very small left sided disc protrusion in L4-L5   Still with bilateral upper, lateral hip pain and some TTP along LEFT greater trochanter - likely pain is related to entire gluteal complex/SI joint and we discussed importance of further PT to address these issues for which she is in agreement    Medications/med changes: no medication changes today  Imaging: reviewed available imaging; may benefit from US of right ulnar nerve vs MRI elbow - will defer to Dr. Lewis for now and if she wishes to proceed with ulnar nerve ultrasound, referral will be made to  or [facility] in [place]  Therapies: start PT at [facility] for comprehensive lower limb program focused on modalities, gluteal complex, SI joint, GTPS of the left side  Injections: offered IM kenalog vs left greater troch bursa injection today - she opted for left GTB injection, RBA discussed   Other: she was instructed to send us her prior compound cream ingredients as I discussed other topical options for elbow pain  Referrals: none at this time  Counseling: counseled on reassuring imaging and EMG findings; natural history of chronic pain and further treatment options    No orders of the defined types were placed in this encounter.      The patient was educated about the diagnosis, prognosis, indications, risks and benefits of treatment. An opportunity to ask questions was given to the patient and questions were answered.  The patient agreed to proceed with the recommended treatment as described above.     Follow up: 6-8 weeks       Thank you for allowing me to participate in the care of your patient.     Gia Lim MD  Board Certified Physical Medicine and Rehabilitation    Note: This report was completed using [email or web address] voiced recognition software.  Every effort has been made to ensure accuracy; however, inadvertent computerized transcription errors may be present.

## 2024-12-27 RX ORDER — TRAZODONE HYDROCHLORIDE 50 MG/1
TABLET, FILM COATED ORAL
Qty: 30 TABLET | Refills: 0 | Status: SHIPPED | OUTPATIENT
Start: 2024-12-27

## 2025-01-29 ENCOUNTER — OFFICE VISIT (OUTPATIENT)
Dept: PHYSICAL MEDICINE AND REHAB | Age: 40
End: 2025-01-29
Payer: COMMERCIAL

## 2025-01-29 VITALS
WEIGHT: 220.46 LBS | BODY MASS INDEX: 35.43 KG/M2 | DIASTOLIC BLOOD PRESSURE: 94 MMHG | HEIGHT: 66 IN | HEART RATE: 94 BPM | SYSTOLIC BLOOD PRESSURE: 149 MMHG

## 2025-01-29 DIAGNOSIS — G89.4 CHRONIC PAIN SYNDROME: ICD-10-CM

## 2025-01-29 DIAGNOSIS — M25.551 GREATER TROCHANTERIC PAIN SYNDROME OF BOTH LOWER EXTREMITIES: Primary | ICD-10-CM

## 2025-01-29 DIAGNOSIS — M51.362 DEGENERATION OF INTERVERTEBRAL DISC OF LUMBAR REGION WITH DISCOGENIC BACK PAIN AND LOWER EXTREMITY PAIN: ICD-10-CM

## 2025-01-29 DIAGNOSIS — M25.552 GREATER TROCHANTERIC PAIN SYNDROME OF BOTH LOWER EXTREMITIES: Primary | ICD-10-CM

## 2025-01-29 PROCEDURE — G8427 DOCREV CUR MEDS BY ELIG CLIN: HCPCS | Performed by: STUDENT IN AN ORGANIZED HEALTH CARE EDUCATION/TRAINING PROGRAM

## 2025-01-29 PROCEDURE — 1036F TOBACCO NON-USER: CPT | Performed by: STUDENT IN AN ORGANIZED HEALTH CARE EDUCATION/TRAINING PROGRAM

## 2025-01-29 PROCEDURE — 99214 OFFICE O/P EST MOD 30 MIN: CPT | Performed by: STUDENT IN AN ORGANIZED HEALTH CARE EDUCATION/TRAINING PROGRAM

## 2025-01-29 PROCEDURE — G8417 CALC BMI ABV UP PARAM F/U: HCPCS | Performed by: STUDENT IN AN ORGANIZED HEALTH CARE EDUCATION/TRAINING PROGRAM

## 2025-01-29 NOTE — PROGRESS NOTES
Gia Lim MD  Physical Medicine & Rehabilitation  [address] [address]  [phone]     2/4/25    Chief Complaint   Patient presents with    Follow-up     Patient presents for PT follow up and Mri results. Reports small help with PT.        HPI:  Lyn Gerardo is a 39 y.o. year old woman seen today in follow up regarding right ulnar neuritis and chronic bilateral lateral hip pain.        Interval history (01/29/25):  She reports that she got about 80% improvement for one month with her recent greater trochanter bursa injection from the last visit 12/11/24.    She had improvement in function for that month and then symptoms did flare recently with the initiation of physical therapy.  She is performing some mobility exercises.      Current/Prior treatments for this issue: ice, acetaminophen- , NSAID- , muscle relaxant- , TCA- , SNRI- on effexor, anti-convulsant- on gabapentin, topical agent- lidocaine patches, physical therapy - at the [facility] years ago, chiropractic therapy/manipulation - went to Dr Conn at [facility], massage therapy - goes weekly, TENS unit - has one at home, epidural steroid injections- thoracic spine back in 2016, greater troch bursa injection (12/11/24)  --Physical therapy: currently enrolled - slightly worse recently    Past Medical History:   Diagnosis Date    Blood type, Rh negative     Gestational diabetes     pt takes lantus 28 units nightly    H/O bleeding disorder     Keratosis palmoplantaris with corneal dystrophy (HCC)     Dr Falcon in [place]     Kidney stones     Left ovarian cyst     MTHFR mutation     homozygous       Past Surgical History:   Procedure Laterality Date    CARPAL TUNNEL RELEASE Bilateral 2023    Dr Lewis at [facility]    DILATION AND CURETTAGE  2012    ELBOW SURGERY Bilateral     median nerve release    EYE SURGERY  02/15/2023    FACIAL COSMETIC SURGERY      mole removal    KIDNEY STONE SURGERY  2016    lithotripsy     Family

## 2025-03-19 ENCOUNTER — PROCEDURE VISIT (OUTPATIENT)
Age: 40
End: 2025-03-19

## 2025-03-19 VITALS
SYSTOLIC BLOOD PRESSURE: 138 MMHG | BODY MASS INDEX: 35.45 KG/M2 | HEIGHT: 66 IN | WEIGHT: 220.6 LBS | DIASTOLIC BLOOD PRESSURE: 86 MMHG

## 2025-03-19 DIAGNOSIS — M25.551 BILATERAL HIP PAIN: ICD-10-CM

## 2025-03-19 DIAGNOSIS — M25.552 GREATER TROCHANTERIC PAIN SYNDROME OF BOTH LOWER EXTREMITIES: Primary | ICD-10-CM

## 2025-03-19 DIAGNOSIS — M25.552 BILATERAL HIP PAIN: ICD-10-CM

## 2025-03-19 DIAGNOSIS — M25.551 GREATER TROCHANTERIC PAIN SYNDROME OF BOTH LOWER EXTREMITIES: Primary | ICD-10-CM

## 2025-03-19 RX ORDER — TRIAMCINOLONE ACETONIDE 40 MG/ML
40 INJECTION, SUSPENSION INTRA-ARTICULAR; INTRAMUSCULAR ONCE
Status: COMPLETED | OUTPATIENT
Start: 2025-03-19 | End: 2025-03-19

## 2025-03-19 RX ADMIN — Medication 2 ML: at 09:55

## 2025-03-19 RX ADMIN — Medication 2 ML: at 09:56

## 2025-03-19 RX ADMIN — TRIAMCINOLONE ACETONIDE 40 MG: 40 INJECTION, SUSPENSION INTRA-ARTICULAR; INTRAMUSCULAR at 09:57

## 2025-03-19 NOTE — PROGRESS NOTES
Please follow-up with the primary care provider, anything changes or worsens, please return emergency department Site  Hip Left Documented By  Yusra Meléndez MA    NDC: 9659-6235-64    Lot#: [id number]    : HOSPIRA    Patient Supplied?: No               Admin Date  03/19/2025  09:56 Action  Given Dose  2 mL Route  Other Site  Hip Right Documented By  Yusra Meléndez MA    NDC: 7607-6842-21    Lot#: [id number]    : HOSPIRA    Patient Supplied?: No              triamcinolone acetonide (KENALOG-40) injection 40 mg       Admin Date  03/19/2025  09:57 Action  Given Dose  40 mg Route  Other Site  Hip Right Documented By  Yusra Meléndez MA    NDC: 0689-7612-04    Lot#: [id number]    : [facility] (PRIMARY CARE)    Patient Supplied?: No      Admin Date  03/19/2025  09:57 Action  Given Dose  40 mg Route  Other Site  Hip Left Documented By  Yusra Meléndez MA    NDC: 3679-0516-37    Lot#: [id number]    : [facility] (PRIMARY CARE)    Patient Supplied?: No

## 2025-04-07 SDOH — ECONOMIC STABILITY: INCOME INSECURITY: IN THE LAST 12 MONTHS, WAS THERE A TIME WHEN YOU WERE NOT ABLE TO PAY THE MORTGAGE OR RENT ON TIME?: NO

## 2025-04-07 SDOH — ECONOMIC STABILITY: FOOD INSECURITY: WITHIN THE PAST 12 MONTHS, YOU WORRIED THAT YOUR FOOD WOULD RUN OUT BEFORE YOU GOT MONEY TO BUY MORE.: NEVER TRUE

## 2025-04-07 SDOH — ECONOMIC STABILITY: FOOD INSECURITY: WITHIN THE PAST 12 MONTHS, THE FOOD YOU BOUGHT JUST DIDN'T LAST AND YOU DIDN'T HAVE MONEY TO GET MORE.: NEVER TRUE

## 2025-04-07 SDOH — ECONOMIC STABILITY: TRANSPORTATION INSECURITY
IN THE PAST 12 MONTHS, HAS THE LACK OF TRANSPORTATION KEPT YOU FROM MEDICAL APPOINTMENTS OR FROM GETTING MEDICATIONS?: NO

## 2025-04-07 ASSESSMENT — PATIENT HEALTH QUESTIONNAIRE - PHQ9
4. FEELING TIRED OR HAVING LITTLE ENERGY: MORE THAN HALF THE DAYS
10. IF YOU CHECKED OFF ANY PROBLEMS, HOW DIFFICULT HAVE THESE PROBLEMS MADE IT FOR YOU TO DO YOUR WORK, TAKE CARE OF THINGS AT HOME, OR GET ALONG WITH OTHER PEOPLE: SOMEWHAT DIFFICULT
SUM OF ALL RESPONSES TO PHQ QUESTIONS 1-9: 11
5. POOR APPETITE OR OVEREATING: NEARLY EVERY DAY
10. IF YOU CHECKED OFF ANY PROBLEMS, HOW DIFFICULT HAVE THESE PROBLEMS MADE IT FOR YOU TO DO YOUR WORK, TAKE CARE OF THINGS AT HOME, OR GET ALONG WITH OTHER PEOPLE: SOMEWHAT DIFFICULT
SUM OF ALL RESPONSES TO PHQ QUESTIONS 1-9: 11
5. POOR APPETITE OR OVEREATING: NEARLY EVERY DAY
9. THOUGHTS THAT YOU WOULD BE BETTER OFF DEAD, OR OF HURTING YOURSELF: NOT AT ALL
1. LITTLE INTEREST OR PLEASURE IN DOING THINGS: SEVERAL DAYS
8. MOVING OR SPEAKING SO SLOWLY THAT OTHER PEOPLE COULD HAVE NOTICED. OR THE OPPOSITE - BEING SO FIDGETY OR RESTLESS THAT YOU HAVE BEEN MOVING AROUND A LOT MORE THAN USUAL: SEVERAL DAYS
3. TROUBLE FALLING OR STAYING ASLEEP: SEVERAL DAYS
SUM OF ALL RESPONSES TO PHQ QUESTIONS 1-9: 11
7. TROUBLE CONCENTRATING ON THINGS, SUCH AS READING THE NEWSPAPER OR WATCHING TELEVISION: SEVERAL DAYS
7. TROUBLE CONCENTRATING ON THINGS, SUCH AS READING THE NEWSPAPER OR WATCHING TELEVISION: SEVERAL DAYS
2. FEELING DOWN, DEPRESSED OR HOPELESS: SEVERAL DAYS
SUM OF ALL RESPONSES TO PHQ QUESTIONS 1-9: 11
6. FEELING BAD ABOUT YOURSELF - OR THAT YOU ARE A FAILURE OR HAVE LET YOURSELF OR YOUR FAMILY DOWN: SEVERAL DAYS
4. FEELING TIRED OR HAVING LITTLE ENERGY: MORE THAN HALF THE DAYS
6. FEELING BAD ABOUT YOURSELF - OR THAT YOU ARE A FAILURE OR HAVE LET YOURSELF OR YOUR FAMILY DOWN: SEVERAL DAYS
2. FEELING DOWN, DEPRESSED OR HOPELESS: SEVERAL DAYS
3. TROUBLE FALLING OR STAYING ASLEEP: SEVERAL DAYS
8. MOVING OR SPEAKING SO SLOWLY THAT OTHER PEOPLE COULD HAVE NOTICED. OR THE OPPOSITE, BEING SO FIGETY OR RESTLESS THAT YOU HAVE BEEN MOVING AROUND A LOT MORE THAN USUAL: SEVERAL DAYS
1. LITTLE INTEREST OR PLEASURE IN DOING THINGS: SEVERAL DAYS
9. THOUGHTS THAT YOU WOULD BE BETTER OFF DEAD, OR OF HURTING YOURSELF: NOT AT ALL
SUM OF ALL RESPONSES TO PHQ QUESTIONS 1-9: 11

## 2025-04-07 NOTE — PROGRESS NOTES
168/110   Pulse (!) 102   Temp 96.8 °F (36 °C)   Resp 18   Ht 1.676 m (5' 6\")   Wt 100.7 kg (222 lb)   LMP 03/05/2025   SpO2 98%   BMI 35.83 kg/m²     Physical Exam  Vitals reviewed.   Constitutional:       General: She is not in acute distress.     Appearance: Normal appearance.   HENT:      Head: Normocephalic and atraumatic.      Right Ear: Tympanic membrane normal.      Left Ear: There is impacted cerumen.      Nose: Nose normal. No congestion.      Mouth/Throat:      Mouth: Mucous membranes are moist.      Pharynx: Oropharynx is clear. No oropharyngeal exudate.   Eyes:      Conjunctiva/sclera: Conjunctivae normal.   Cardiovascular:      Rate and Rhythm: Normal rate and regular rhythm.      Pulses: Normal pulses.      Heart sounds: No murmur heard.  Pulmonary:      Effort: Pulmonary effort is normal.      Breath sounds: Normal breath sounds. No wheezing, rhonchi or rales.   Abdominal:      General: Bowel sounds are normal. There is no distension.      Tenderness: There is no abdominal tenderness.   Musculoskeletal:      Cervical back: Neck supple.      Right lower leg: No edema.      Left lower leg: No edema.   Lymphadenopathy:      Cervical: No cervical adenopathy.   Skin:     General: Skin is warm and dry.      Findings: No rash.   Neurological:      Mental Status: She is alert.   Psychiatric:         Mood and Affect: Mood normal.         Behavior: Behavior normal.         Judgment: Judgment normal.       An electronic signature was used to authenticate this note.    --Nir Sarabia MD

## 2025-04-08 ENCOUNTER — OFFICE VISIT (OUTPATIENT)
Dept: FAMILY MEDICINE CLINIC | Age: 40
End: 2025-04-08
Payer: COMMERCIAL

## 2025-04-08 VITALS
TEMPERATURE: 96.8 F | DIASTOLIC BLOOD PRESSURE: 110 MMHG | HEIGHT: 66 IN | SYSTOLIC BLOOD PRESSURE: 168 MMHG | HEART RATE: 102 BPM | BODY MASS INDEX: 35.68 KG/M2 | WEIGHT: 222 LBS | RESPIRATION RATE: 18 BRPM | OXYGEN SATURATION: 98 %

## 2025-04-08 DIAGNOSIS — F41.8 ANXIETY WITH DEPRESSION: ICD-10-CM

## 2025-04-08 DIAGNOSIS — I10 ACCELERATED HYPERTENSION: Primary | ICD-10-CM

## 2025-04-08 DIAGNOSIS — H61.22 IMPACTED CERUMEN OF LEFT EAR: ICD-10-CM

## 2025-04-08 PROCEDURE — 93000 ELECTROCARDIOGRAM COMPLETE: CPT | Performed by: FAMILY MEDICINE

## 2025-04-08 PROCEDURE — 1036F TOBACCO NON-USER: CPT | Performed by: FAMILY MEDICINE

## 2025-04-08 PROCEDURE — 3080F DIAST BP >= 90 MM HG: CPT | Performed by: FAMILY MEDICINE

## 2025-04-08 PROCEDURE — 99214 OFFICE O/P EST MOD 30 MIN: CPT | Performed by: FAMILY MEDICINE

## 2025-04-08 PROCEDURE — G8427 DOCREV CUR MEDS BY ELIG CLIN: HCPCS | Performed by: FAMILY MEDICINE

## 2025-04-08 PROCEDURE — 69210 REMOVE IMPACTED EAR WAX UNI: CPT | Performed by: FAMILY MEDICINE

## 2025-04-08 PROCEDURE — G8417 CALC BMI ABV UP PARAM F/U: HCPCS | Performed by: FAMILY MEDICINE

## 2025-04-08 PROCEDURE — 3077F SYST BP >= 140 MM HG: CPT | Performed by: FAMILY MEDICINE

## 2025-04-08 RX ORDER — AMITRIPTYLINE HYDROCHLORIDE 100 MG/1
100 TABLET ORAL NIGHTLY
Qty: 90 TABLET | Refills: 1 | Status: SHIPPED | OUTPATIENT
Start: 2025-04-08

## 2025-04-08 RX ORDER — VENLAFAXINE 75 MG/1
75 TABLET ORAL DAILY
COMMUNITY
End: 2025-04-08 | Stop reason: DRUGHIGH

## 2025-04-08 RX ORDER — LISINOPRIL 20 MG/1
20 TABLET ORAL DAILY
Qty: 30 TABLET | Refills: 5 | Status: SHIPPED | OUTPATIENT
Start: 2025-04-08

## 2025-04-08 RX ORDER — VENLAFAXINE HYDROCHLORIDE 150 MG/1
150 CAPSULE, EXTENDED RELEASE ORAL DAILY
Qty: 30 CAPSULE | Refills: 1 | Status: SHIPPED | OUTPATIENT
Start: 2025-04-08

## 2025-04-08 ASSESSMENT — ENCOUNTER SYMPTOMS
TROUBLE SWALLOWING: 0
BACK PAIN: 1
ABDOMINAL PAIN: 0
SHORTNESS OF BREATH: 0

## 2025-04-08 NOTE — ASSESSMENT & PLAN NOTE
Orders:    EKG 12 lead; Future    CBC with Auto Differential    Comprehensive Metabolic Panel    TSH    T4, Free    Cortisol Total    Aldosterone & Renin, Direct with Ratio    VMA, urine, 24 hour; Future    Metanephrines, urine, 24 hour; Future    Urinalysis with Microscopic; Future    Protein Electrophoresis, Urine; Future    US RETROPERITONEAL COMPLETE; Future

## 2025-04-09 ENCOUNTER — HOSPITAL ENCOUNTER (OUTPATIENT)
Dept: LAB | Age: 40
Discharge: HOME | End: 2025-04-09
Payer: COMMERCIAL

## 2025-04-09 DIAGNOSIS — N93.8 DUB (DYSFUNCTIONAL UTERINE BLEEDING): ICD-10-CM

## 2025-04-09 DIAGNOSIS — I10 ACCELERATED HYPERTENSION: ICD-10-CM

## 2025-04-09 LAB
ALBUMIN SERPL-MCNC: 4.2 G/DL (ref 3.5–5.2)
ALP SERPL-CCNC: 87 U/L (ref 35–104)
ALT SERPL-CCNC: 20 U/L (ref 0–32)
ANION GAP SERPL CALCULATED.3IONS-SCNC: 9 MMOL/L (ref 7–16)
AST SERPL-CCNC: 16 U/L (ref 0–31)
BACTERIA URNS QL MICRO: ABNORMAL
BASOPHILS # BLD: 0.04 K/UL (ref 0–0.2)
BASOPHILS NFR BLD: 0 % (ref 0–2)
BILIRUB SERPL-MCNC: 0.2 MG/DL (ref 0–1.2)
BILIRUB UR QL STRIP: NEGATIVE
BUN SERPL-MCNC: 14 MG/DL (ref 6–20)
CALCIUM SERPL-MCNC: 9.2 MG/DL (ref 8.6–10.2)
CHLORIDE SERPL-SCNC: 101 MMOL/L (ref 98–107)
CLARITY UR: CLEAR
CO2 SERPL-SCNC: 27 MMOL/L (ref 22–29)
COLOR UR: YELLOW
CORTIS SERPL-MCNC: 1.5 UG/DL (ref 2.7–18.4)
CREAT SERPL-MCNC: 0.6 MG/DL (ref 0.5–1)
EOSINOPHIL # BLD: 0.08 K/UL (ref 0.05–0.5)
EOSINOPHILS RELATIVE PERCENT: 1 % (ref 0–6)
ERYTHROCYTE [DISTWIDTH] IN BLOOD BY AUTOMATED COUNT: 12.5 % (ref 11.5–15)
GFR, ESTIMATED: >90 ML/MIN/1.73M2
GLUCOSE SERPL-MCNC: 102 MG/DL (ref 74–99)
GLUCOSE UR STRIP-MCNC: NEGATIVE MG/DL
HCT VFR BLD AUTO: 41.1 % (ref 34–48)
HGB BLD-MCNC: 13.3 G/DL (ref 11.5–15.5)
HGB UR QL STRIP.AUTO: ABNORMAL
IMM GRANULOCYTES # BLD AUTO: 0.04 K/UL (ref 0–0.58)
IMM GRANULOCYTES NFR BLD: 0 % (ref 0–5)
KETONES UR STRIP-MCNC: NEGATIVE MG/DL
LEUKOCYTE ESTERASE UR QL STRIP: NEGATIVE
LYMPHOCYTES NFR BLD: 2.55 K/UL (ref 1.5–4)
LYMPHOCYTES RELATIVE PERCENT: 27 % (ref 20–42)
MCH RBC QN AUTO: 29.5 PG (ref 26–35)
MCHC RBC AUTO-ENTMCNC: 32.4 G/DL (ref 32–34.5)
MCV RBC AUTO: 91.1 FL (ref 80–99.9)
MONOCYTES NFR BLD: 0.51 K/UL (ref 0.1–0.95)
MONOCYTES NFR BLD: 5 % (ref 2–12)
NEUTROPHILS NFR BLD: 66 % (ref 43–80)
NEUTS SEG NFR BLD: 6.26 K/UL (ref 1.8–7.3)
NITRITE UR QL STRIP: NEGATIVE
PH UR STRIP: 6 [PH] (ref 5–8)
PLATELET # BLD AUTO: 285 K/UL (ref 130–450)
PMV BLD AUTO: 9.1 FL (ref 7–12)
POTASSIUM SERPL-SCNC: 3.7 MMOL/L (ref 3.5–5)
PROT SERPL-MCNC: 7.4 G/DL (ref 6.4–8.3)
PROT UR STRIP-MCNC: NEGATIVE MG/DL
RBC # BLD AUTO: 4.51 M/UL (ref 3.5–5.5)
RBC #/AREA URNS HPF: ABNORMAL /HPF
SODIUM SERPL-SCNC: 137 MMOL/L (ref 132–146)
SP GR UR STRIP: 1.02 (ref 1–1.03)
T4 FREE SERPL-MCNC: 1 NG/DL (ref 0.9–1.7)
TSH SERPL DL<=0.05 MIU/L-ACNC: 2.23 UIU/ML (ref 0.27–4.2)
UROBILINOGEN UR STRIP-ACNC: 0.2 EU/DL (ref 0–1)
WBC #/AREA URNS HPF: ABNORMAL /HPF
WBC OTHER # BLD: 9.5 K/UL (ref 4.5–11.5)

## 2025-04-09 PROCEDURE — 85025 COMPLETE CBC W/AUTO DIFF WBC: CPT

## 2025-04-09 PROCEDURE — 84166 PROTEIN E-PHORESIS/URINE/CSF: CPT

## 2025-04-09 PROCEDURE — 80053 COMPREHEN METABOLIC PANEL: CPT

## 2025-04-09 PROCEDURE — 82533 TOTAL CORTISOL: CPT

## 2025-04-09 PROCEDURE — 84439 ASSAY OF FREE THYROXINE: CPT

## 2025-04-09 PROCEDURE — 82088 ASSAY OF ALDOSTERONE: CPT

## 2025-04-09 PROCEDURE — 84156 ASSAY OF PROTEIN URINE: CPT

## 2025-04-09 PROCEDURE — 84443 ASSAY THYROID STIM HORMONE: CPT

## 2025-04-09 PROCEDURE — 81001 URINALYSIS AUTO W/SCOPE: CPT

## 2025-04-09 PROCEDURE — 36415 COLL VENOUS BLD VENIPUNCTURE: CPT

## 2025-04-09 PROCEDURE — 84244 ASSAY OF RENIN: CPT

## 2025-04-10 ENCOUNTER — HOSPITAL ENCOUNTER (OUTPATIENT)
Age: 40
Setting detail: SPECIMEN
Discharge: HOME | End: 2025-04-10
Payer: COMMERCIAL

## 2025-04-10 LAB
CREAT 24H UR-MCNC: NORMAL MG/DL
CREAT 24H UR-MCNC: NORMAL MG/DL
CREATININE URINE /24 HR: NORMAL
CREATININE URINE /24 HR: NORMAL
METANEPHRINE UF INTERPRETATION: NORMAL
METANEPHRINE UG/G CRE: NORMAL
METANEPHRINES 24 HOUR URINE: NORMAL
METANEPHRINES, URINE (UMOL/L): NORMAL
NORMETANEPHRINE, (G/CRT): NORMAL
NORMETANEPHRINE, (NMOL/DAY): NORMAL
NORMETANEPHRINES, NMOL/L: NORMAL
SPECIMEN VOL ?TM UR: NORMAL ML
SPECIMEN VOL ?TM UR: NORMAL ML
VMA INTERP: NORMAL
VMA, URINE /24 HR: NORMAL
VMA, URINE /VOLUME: NORMAL
VMA, URINE RATIO CREATININE: NORMAL

## 2025-04-10 PROCEDURE — 84585 ASSAY OF URINE VMA: CPT

## 2025-04-10 PROCEDURE — 83835 ASSAY OF METANEPHRINES: CPT

## 2025-04-11 LAB
P E INTERPRETATION, U: NORMAL
PATHOLOGIST: NORMAL
SPECIMEN TYPE: NORMAL

## 2025-04-12 LAB
ALDOST SERPL-MCNC: 11.7 NG/DL
ALDOSTERONE COMMENT: NORMAL

## 2025-04-14 LAB
ALDOST/RENIN PLAS-RTO: 1.6 RATIO (ref 0.1–3.7)
RENIN PLAS-MCNC: 7.3 PG/ML

## 2025-04-15 LAB
CREAT 24H UR-MCNC: 53 MG/DL
CREATININE URINE /24 HR: 1590 MG/D (ref 700–1600)
METANEPHRINE UF INTERPRETATION: NORMAL
METANEPHRINE UG/G CRE: 43 UG/G CRT (ref 0–300)
METANEPHRINES 24 HOUR URINE: 69 UG/D (ref 36–229)
METANEPHRINES, URINE (UMOL/L): 23 UG/L
NORMETANEPHRINE, (G/CRT): 240 UG/G CRT (ref 0–400)
NORMETANEPHRINE, (NMOL/DAY): 381 UG/D (ref 95–650)
NORMETANEPHRINES, NMOL/L: 127 UG/L
SPECIMEN VOL ?TM UR: 3000 ML

## 2025-04-16 ENCOUNTER — OFFICE VISIT (OUTPATIENT)
Dept: FAMILY MEDICINE CLINIC | Age: 40
End: 2025-04-16
Payer: COMMERCIAL

## 2025-04-16 ENCOUNTER — RESULTS FOLLOW-UP (OUTPATIENT)
Dept: FAMILY MEDICINE CLINIC | Age: 40
End: 2025-04-16

## 2025-04-16 VITALS
SYSTOLIC BLOOD PRESSURE: 142 MMHG | BODY MASS INDEX: 35.36 KG/M2 | WEIGHT: 220 LBS | OXYGEN SATURATION: 98 % | TEMPERATURE: 97.5 F | DIASTOLIC BLOOD PRESSURE: 98 MMHG | HEIGHT: 66 IN | HEART RATE: 104 BPM | RESPIRATION RATE: 16 BRPM

## 2025-04-16 DIAGNOSIS — I10 ESSENTIAL HYPERTENSION: Primary | ICD-10-CM

## 2025-04-16 DIAGNOSIS — R79.89 LOW SERUM CORTISOL LEVEL: ICD-10-CM

## 2025-04-16 DIAGNOSIS — F41.8 ANXIETY WITH DEPRESSION: ICD-10-CM

## 2025-04-16 PROCEDURE — G8427 DOCREV CUR MEDS BY ELIG CLIN: HCPCS | Performed by: FAMILY MEDICINE

## 2025-04-16 PROCEDURE — 99214 OFFICE O/P EST MOD 30 MIN: CPT | Performed by: FAMILY MEDICINE

## 2025-04-16 PROCEDURE — G8417 CALC BMI ABV UP PARAM F/U: HCPCS | Performed by: FAMILY MEDICINE

## 2025-04-16 PROCEDURE — 1036F TOBACCO NON-USER: CPT | Performed by: FAMILY MEDICINE

## 2025-04-16 PROCEDURE — 3077F SYST BP >= 140 MM HG: CPT | Performed by: FAMILY MEDICINE

## 2025-04-16 PROCEDURE — 3080F DIAST BP >= 90 MM HG: CPT | Performed by: FAMILY MEDICINE

## 2025-04-16 RX ORDER — LISINOPRIL AND HYDROCHLOROTHIAZIDE 12.5; 2 MG/1; MG/1
1 TABLET ORAL DAILY
Qty: 30 TABLET | Refills: 5 | Status: SHIPPED | OUTPATIENT
Start: 2025-04-16

## 2025-04-16 SDOH — ECONOMIC STABILITY: FOOD INSECURITY: WITHIN THE PAST 12 MONTHS, THE FOOD YOU BOUGHT JUST DIDN'T LAST AND YOU DIDN'T HAVE MONEY TO GET MORE.: NEVER TRUE

## 2025-04-16 SDOH — ECONOMIC STABILITY: FOOD INSECURITY: WITHIN THE PAST 12 MONTHS, YOU WORRIED THAT YOUR FOOD WOULD RUN OUT BEFORE YOU GOT MONEY TO BUY MORE.: NEVER TRUE

## 2025-04-16 NOTE — PROGRESS NOTES
Lyn Gerardo is a 40 y.o. year old female Established patient, here for evaluation of the following chief complaint(s):    Chief Complaint   Patient presents with    Hypertension         Assessment & Plan  Essential hypertension    Issue is improving, not quite at goal.  Results are not entirely back yet.  Await the remainder of the analysis of blood and urine studies.  Await ultrasound.  For now, will add HCTZ to regimen.  Prescription management performed as below.  Will need a BMP at follow-up.    Orders:    lisinopril-hydroCHLOROthiazide (PRINZIDE;ZESTORETIC) 20-12.5 MG per tablet; Take 1 tablet by mouth daily    Anxiety with depression    Issue is improving.  Continue higher dose of Effexor         Low serum cortisol level    Recommend repeating to confirm low cortisol state.  Would likely consider referral to endocrine if appropriate    Orders:    Cortisol Total; Future        Patient/guardian was given the opportunity to ask questions regarding the visit today.  Questions were addressed.  They verbalized comfort and understanding of the assessment and plan.    Return in about 1 month (around 5/16/2025), or if symptoms worsen or fail to improve.    SUBJECTIVE/OBJECTIVE:    HPI  Follow-up of hypertension  See last visit notes  Workup for secondary causes is suggestive of only a low cortisol level.  VMA's are not yet back.  Renal ultrasound not yet done  Started on lisinopril at last visit.  Tolerating well.  Denies side effect  Following BPs at home, systolics are routinely in the 140s to low 150s.  Diastolics are typically in the 80s.  The facial flushing and headache have improved.  Not entirely resolved.  No chest pain    Mood disorder  History of depression with anxiety features, insomnia  At last visit dose of Effexor XR was increased to 150 mg daily  She is noting a marked improvement even after 1 week.  More tolerant of stress.  Feels better about her situations.  Less depressed  No thoughts of

## 2025-04-17 LAB
CREAT 24H UR-MCNC: 53 MG/DL
CREATININE URINE /24 HR: 1590 MG/D (ref 700–1600)
SPECIMEN VOL ?TM UR: 3000 ML
VMA INTERP: NORMAL
VMA, URINE /24 HR: 4.2 MG/D (ref 0–7)
VMA, URINE /VOLUME: 1.4 MG/L
VMA, URINE RATIO CREATININE: 3 MG/GCR (ref 0–6)

## 2025-04-18 ASSESSMENT — ENCOUNTER SYMPTOMS
SHORTNESS OF BREATH: 0
BACK PAIN: 1
ABDOMINAL PAIN: 0

## 2025-04-23 LAB
COLLECT DURATION TIME SPEC: 24 H
COLLECT DURATION TIME SPEC: 24 H
CREAT 24H UR-MCNC: 53 MG/DL
CREAT 24H UR-MCNC: 53 MG/DL
CREATININE URINE /24 HR: 1590 MG/D (ref 700–1600)
CREATININE URINE /24 HR: 1590 MG/D (ref 700–1600)
METANEPHRINE UF INTERPRETATION: NORMAL
METANEPHRINE UG/G CRE: 43 UG/G CRT (ref 0–300)
METANEPHRINES 24 HOUR URINE: 69 UG/D (ref 36–229)
METANEPHRINES, URINE (UMOL/L): 23 UG/L
NORMETANEPHRINE, (G/CRT): 240 UG/G CRT (ref 0–400)
NORMETANEPHRINE, (NMOL/DAY): 381 UG/D (ref 95–650)
NORMETANEPHRINES, NMOL/L: 127 UG/L
SPECIMEN VOL ?TM UR: 3000 ML
SPECIMEN VOL ?TM UR: 3000 ML
VMA INTERP: NORMAL
VMA, URINE /24 HR: 4.2 MG/D (ref 0–7)
VMA, URINE /VOLUME: 1.4 MG/L
VMA, URINE RATIO CREATININE: 3 MG/GCR (ref 0–6)

## 2025-05-07 ENCOUNTER — TELEPHONE (OUTPATIENT)
Dept: FAMILY MEDICINE CLINIC | Age: 40
End: 2025-05-07

## 2025-05-07 ENCOUNTER — HOSPITAL ENCOUNTER (OUTPATIENT)
Dept: ULTRASOUND IMAGING | Age: 40
Discharge: HOME | End: 2025-05-09
Payer: COMMERCIAL

## 2025-05-07 DIAGNOSIS — I10 ESSENTIAL HYPERTENSION: ICD-10-CM

## 2025-05-07 DIAGNOSIS — I10 ACCELERATED HYPERTENSION: ICD-10-CM

## 2025-05-07 DIAGNOSIS — I10 ESSENTIAL HYPERTENSION: Primary | ICD-10-CM

## 2025-05-07 PROCEDURE — 76770 US EXAM ABDO BACK WALL COMP: CPT

## 2025-05-07 PROCEDURE — 93975 VASCULAR STUDY: CPT

## 2025-05-07 NOTE — TELEPHONE ENCOUNTER
Renal artery US is not included in Retroperitoneal US-     Radiology needs clarification on what PCP would like to order- DX on US that is ordered for retroperitoneal is Hypertension- radiology said typically Renal Artery US are ordered for that DX    Pt is there now to have US done and radiology was hoping for clarification ASAP so pt doesn't have to come back and do another US    Please advise and call Radiology back @ [phone]; spoke to Willian

## 2025-05-07 NOTE — TELEPHONE ENCOUNTER
I have only seen this now.  I changed the order.  In the future, please call me to address more immediately.

## 2025-05-07 NOTE — TELEPHONE ENCOUNTER
US was completed and radiology was able to take the additional images needed for the other order, just needed the order placed.

## 2025-05-12 ENCOUNTER — RESULTS FOLLOW-UP (OUTPATIENT)
Dept: FAMILY MEDICINE CLINIC | Age: 40
End: 2025-05-12

## 2025-05-12 DIAGNOSIS — I70.1 RENAL ARTERY STENOSIS: Primary | ICD-10-CM

## 2025-05-12 DIAGNOSIS — I10 ACCELERATED HYPERTENSION: ICD-10-CM

## 2025-05-28 ENCOUNTER — OFFICE VISIT (OUTPATIENT)
Age: 40
End: 2025-05-28
Payer: COMMERCIAL

## 2025-05-28 VITALS
WEIGHT: 221 LBS | HEIGHT: 66 IN | BODY MASS INDEX: 35.52 KG/M2 | SYSTOLIC BLOOD PRESSURE: 145 MMHG | DIASTOLIC BLOOD PRESSURE: 90 MMHG

## 2025-05-28 DIAGNOSIS — M25.551 GREATER TROCHANTERIC PAIN SYNDROME OF BOTH LOWER EXTREMITIES: Primary | ICD-10-CM

## 2025-05-28 DIAGNOSIS — M25.552 GREATER TROCHANTERIC PAIN SYNDROME OF BOTH LOWER EXTREMITIES: Primary | ICD-10-CM

## 2025-05-28 DIAGNOSIS — M79.18 MYOFASCIAL PAIN SYNDROME: ICD-10-CM

## 2025-05-28 PROCEDURE — 3077F SYST BP >= 140 MM HG: CPT | Performed by: STUDENT IN AN ORGANIZED HEALTH CARE EDUCATION/TRAINING PROGRAM

## 2025-05-28 PROCEDURE — G8417 CALC BMI ABV UP PARAM F/U: HCPCS | Performed by: STUDENT IN AN ORGANIZED HEALTH CARE EDUCATION/TRAINING PROGRAM

## 2025-05-28 PROCEDURE — 99214 OFFICE O/P EST MOD 30 MIN: CPT | Performed by: STUDENT IN AN ORGANIZED HEALTH CARE EDUCATION/TRAINING PROGRAM

## 2025-05-28 PROCEDURE — G8427 DOCREV CUR MEDS BY ELIG CLIN: HCPCS | Performed by: STUDENT IN AN ORGANIZED HEALTH CARE EDUCATION/TRAINING PROGRAM

## 2025-05-28 PROCEDURE — 1036F TOBACCO NON-USER: CPT | Performed by: STUDENT IN AN ORGANIZED HEALTH CARE EDUCATION/TRAINING PROGRAM

## 2025-05-28 PROCEDURE — 3080F DIAST BP >= 90 MM HG: CPT | Performed by: STUDENT IN AN ORGANIZED HEALTH CARE EDUCATION/TRAINING PROGRAM

## 2025-06-02 DIAGNOSIS — F41.8 ANXIETY WITH DEPRESSION: ICD-10-CM

## 2025-06-02 RX ORDER — VENLAFAXINE HYDROCHLORIDE 150 MG/1
150 CAPSULE, EXTENDED RELEASE ORAL DAILY
Qty: 30 CAPSULE | Refills: 2 | Status: SHIPPED | OUTPATIENT
Start: 2025-06-02

## 2025-06-18 ENCOUNTER — HOSPITAL ENCOUNTER (OUTPATIENT)
Age: 40
Discharge: HOME | End: 2025-06-20

## 2025-06-24 LAB — SURGICAL PATHOLOGY REPORT: NORMAL

## 2025-06-30 ENCOUNTER — PROCEDURE VISIT (OUTPATIENT)
Dept: PHYSICAL MEDICINE AND REHAB | Age: 40
End: 2025-06-30
Payer: COMMERCIAL

## 2025-06-30 VITALS
SYSTOLIC BLOOD PRESSURE: 126 MMHG | HEART RATE: 97 BPM | DIASTOLIC BLOOD PRESSURE: 84 MMHG | HEIGHT: 66 IN | WEIGHT: 221 LBS | BODY MASS INDEX: 35.52 KG/M2

## 2025-06-30 DIAGNOSIS — M25.551 GREATER TROCHANTERIC PAIN SYNDROME OF BOTH LOWER EXTREMITIES: Primary | ICD-10-CM

## 2025-06-30 DIAGNOSIS — M25.552 GREATER TROCHANTERIC PAIN SYNDROME OF BOTH LOWER EXTREMITIES: Primary | ICD-10-CM

## 2025-06-30 PROCEDURE — 20611 DRAIN/INJ JOINT/BURSA W/US: CPT | Performed by: STUDENT IN AN ORGANIZED HEALTH CARE EDUCATION/TRAINING PROGRAM

## 2025-06-30 RX ORDER — TRIAMCINOLONE ACETONIDE 40 MG/ML
40 INJECTION, SUSPENSION INTRA-ARTICULAR; INTRAMUSCULAR ONCE
Status: COMPLETED | OUTPATIENT
Start: 2025-06-30 | End: 2025-06-30

## 2025-06-30 RX ORDER — BUPIVACAINE HYDROCHLORIDE 2.5 MG/ML
3 INJECTION, SOLUTION INFILTRATION; PERINEURAL ONCE
Status: COMPLETED | OUTPATIENT
Start: 2025-06-30 | End: 2025-06-30

## 2025-06-30 RX ADMIN — BUPIVACAINE HYDROCHLORIDE 7.5 MG: 2.5 INJECTION, SOLUTION INFILTRATION; PERINEURAL at 11:58

## 2025-06-30 RX ADMIN — BUPIVACAINE HYDROCHLORIDE 7.5 MG: 2.5 INJECTION, SOLUTION INFILTRATION; PERINEURAL at 11:59

## 2025-06-30 RX ADMIN — TRIAMCINOLONE ACETONIDE 40 MG: 40 INJECTION, SUSPENSION INTRA-ARTICULAR; INTRAMUSCULAR at 12:00

## 2025-06-30 NOTE — PROGRESS NOTES
Gia Lim MD  Physical Medicine & Rehabilitation  [facility] PHYSICAL MEDICINE AND RE[facility]ILITAION  [address] [place] [id number]  Dept: [phone]  Loc: [phone]      6/30/2025    Chief Complaint   Patient presents with    Injections     Bilateral trochanteric bursa injection under ultrasound guidance.        Last injection: 03/19/25  Taking anticoagulants/antiplatelets: Yes  Diabetic: No  Febrile/active infection: No    Ambulatory Procedure Time Out  Correct Patient: Yes  Correct Procedure: Yes  Correct Site/Side: Yes  Correct Site(s) Marked: Yes  Informed Consent Signed: Yes  Allergies Verified: Yes  Staff Present & Credential:: Dr. Gia Vidal LPN, MD    Diagnosis:  1. Greater trochanteric pain syndrome of both lower extremities        After explaining the indications, risks, benefits and alternatives of a Bilateral trochanteric bursa injection, the patient agreed to proceed.  A permit was signed and scanned into the chart. The patient was placed in the Bilateral lateral decubitus position and draped appropriately for modesty.  The skin on the lateral hip was prepared with Chloraprep. . Using aseptic no touch technique, a 22 gauge, 3.5\" needle with 1 cc of Kenalog 40 mg/cc and 3 cc of bupivacaine 0.25% was directed to each the trochanteric bursa using ultrasound guidance.  After negative aspiration, the medication was injected in a fanning out method.  Adequate hemostasis was achieved and a bandage applied.   The patient tolerated the procedure well and was educated in post injection care.  The patient was clinically monitored after the injection and left the office without incident. There was post-injection pain reduction. Ultrasound images are uploaded separately in  electronic medical record.    Administrations This Visit       BUPivacaine (MARCAINE) 0.25 % injection 7.5 mg       Admin Date  06/30/2025  11:58 Action  Given

## 2025-08-20 DIAGNOSIS — F41.8 ANXIETY WITH DEPRESSION: ICD-10-CM

## 2025-08-26 RX ORDER — VENLAFAXINE HYDROCHLORIDE 150 MG/1
150 CAPSULE, EXTENDED RELEASE ORAL DAILY
Qty: 30 CAPSULE | Refills: 2 | Status: SHIPPED | OUTPATIENT
Start: 2025-08-26

## 2025-09-04 ENCOUNTER — OFFICE VISIT (OUTPATIENT)
Dept: FAMILY MEDICINE CLINIC | Age: 40
End: 2025-09-04
Payer: COMMERCIAL

## 2025-09-04 VITALS
SYSTOLIC BLOOD PRESSURE: 110 MMHG | HEIGHT: 66 IN | WEIGHT: 223.6 LBS | DIASTOLIC BLOOD PRESSURE: 70 MMHG | TEMPERATURE: 97.9 F | OXYGEN SATURATION: 99 % | BODY MASS INDEX: 35.93 KG/M2 | RESPIRATION RATE: 18 BRPM | HEART RATE: 105 BPM

## 2025-09-04 DIAGNOSIS — I15.0 RENOVASCULAR HYPERTENSION: ICD-10-CM

## 2025-09-04 PROCEDURE — 99213 OFFICE O/P EST LOW 20 MIN: CPT

## 2025-09-04 PROCEDURE — G8427 DOCREV CUR MEDS BY ELIG CLIN: HCPCS

## 2025-09-04 PROCEDURE — G8417 CALC BMI ABV UP PARAM F/U: HCPCS

## 2025-09-04 PROCEDURE — 3078F DIAST BP <80 MM HG: CPT

## 2025-09-04 PROCEDURE — 3074F SYST BP LT 130 MM HG: CPT

## 2025-09-04 PROCEDURE — 1036F TOBACCO NON-USER: CPT

## 2025-09-04 RX ORDER — LISINOPRIL AND HYDROCHLOROTHIAZIDE 12.5; 2 MG/1; MG/1
1 TABLET ORAL DAILY
Qty: 30 TABLET | Refills: 5 | Status: SHIPPED | OUTPATIENT
Start: 2025-09-04

## 2025-09-04 RX ORDER — CLINDAMYCIN PHOSPHATE 10 MG/G
GEL TOPICAL
COMMUNITY
Start: 2025-09-03